# Patient Record
Sex: MALE | Race: OTHER | HISPANIC OR LATINO | Employment: OTHER | URBAN - METROPOLITAN AREA
[De-identification: names, ages, dates, MRNs, and addresses within clinical notes are randomized per-mention and may not be internally consistent; named-entity substitution may affect disease eponyms.]

---

## 2023-04-03 ENCOUNTER — EVALUATION (OUTPATIENT)
Dept: PHYSICAL THERAPY | Facility: CLINIC | Age: 78
End: 2023-04-03

## 2023-04-03 DIAGNOSIS — G89.29 CHRONIC PAIN OF RIGHT KNEE: ICD-10-CM

## 2023-04-03 DIAGNOSIS — Z96.651 TOTAL KNEE REPLACEMENT STATUS, RIGHT: Primary | ICD-10-CM

## 2023-04-03 DIAGNOSIS — M25.561 CHRONIC PAIN OF RIGHT KNEE: ICD-10-CM

## 2023-04-03 NOTE — PROGRESS NOTES
PT Evaluation     Today's date: 4/3/2023  Patient name: Nida Baez  : [birth year]  MRN: [id number]  Referring provider: Solange العلي DO  Dx:   Encounter Diagnosis     ICD-10-CM    1  Total knee replacement status, right  Z96 651       2  Chronic pain of right knee  M25 561     G89 29                      Assessment  Assessment details: Patient is a 76 y o  male who presents to physical therapy with physician diagnosis of Total knee replacement status, right  (primary encounter diagnosis)  Chronic pain of right knee  Patient would benefit from skilled physical therapy intervention to address his impairments and to maximize function  Thank you for your referral and please feel free to contact me at [phone]  Understanding of Dx/Px/POC: good   Prognosis: good    Goals  STG 4 weeks  Improve ROM by 50%  Improve strength by 50%    LTG 8 weeks  Ambulate without an AD  Independent with dressing and bathing    Plan  Patient would benefit from: skilled physical therapy  Referral necessary: No  Frequency: 3x week  Duration in weeks: 8          Subjective Evaluation    History of Present Illness  Mechanism of injury: Patient is status post right TKA on 3/6/2023  He has been receiving home PT  He is taking Tylenol PRN  He is performing his home exercises and icing PRN  He continues to ambulate with a RW  He requires assistance with dressing and bathing     4 steps into home  1st floor set up  Pain  Current pain ratin  At best pain ratin  At worst pain ratin  Quality: dull ache    Treatments  Previous treatment: physical therapy and medication  Patient Goals  Patient goals for therapy: decreased pain, decreased edema, improved balance, increased strength, independence with ADLs/IADLs and increased motion      Objective     General Comments:      Knee Comments  Severe pitting edema below knee  PROM 0-95  MMT 3+/5  Inefficient quad set  Gait- with RW and inefficient weight acceptance  Hypomobile TF and PF joints all planes  Soleus tightness             Precautions: none      Manuals 4/3                                                                Neuro Re-Ed                                                                                                        Ther Ex             Bike  AAROM 15 min                                                                                                       Ther Activity                                       Gait Training                                       Modalities

## 2023-04-03 NOTE — LETTER
April 3, 2023    Darryn Kennedy, [address]  [address] [place] [address]    Patient: Bozena Hayes   YOB: 1945   Date of Visit: 4/3/2023     Encounter Diagnosis     ICD-10-CM    1  Total knee replacement status, right  Z96 651       2  Chronic pain of right knee  M25 561     G89 29           Dear Dr Jair Jimenezside: Thank you for your recent referral of Bozena Hayes  Please review the attached evaluation summary from Neal's recent visit  Please verify that you agree with the plan of care by signing the attached order  If you have any questions or concerns, please do not hesitate to call  I sincerely appreciate the opportunity to share in the care of one of your patients and hope to have another opportunity to work with you in the near future  Sincerely,    Chris Schuler, PT      Referring Provider:      I certify that I have read the below Plan of Care and certify the need for these services furnished under this plan of treatment while under my care  Darryn Kennedy DO  [address] [address] [address]  [address]  [address] [address]  Via Fax: [phone]          PT Evaluation     Today's date: 4/3/2023  Patient name: Bozena Hayes  : [birth year]  MRN: [id number]  Referring provider: Farzana Daugherty DO  Dx:   Encounter Diagnosis     ICD-10-CM    1  Total knee replacement status, right  Z96 651       2  Chronic pain of right knee  M25 561     G89 29                      Assessment  Assessment details: Patient is a 76 y o  male who presents to physical therapy with physician diagnosis of Total knee replacement status, right  (primary encounter diagnosis)  Chronic pain of right knee  Patient would benefit from skilled physical therapy intervention to address his impairments and to maximize function  Thank you for your referral and please feel free to contact me at [phone]    Understanding of Dx/Px/POC: good   Prognosis: good    Goals  STG 4 weeks  Improve ROM by 50%  Improve strength by 50%    LTG 8 weeks  Ambulate without an AD  Independent with dressing and bathing    Plan  Patient would benefit from: skilled physical therapy  Referral necessary: No  Frequency: 3x week  Duration in weeks: 8          Subjective Evaluation    History of Present Illness  Mechanism of injury: Patient is status post right TKA on 3/6/2023  He has been receiving home PT  He is taking Tylenol PRN  He is performing his home exercises and icing PRN  He continues to ambulate with a RW  He requires assistance with dressing and bathing     4 steps into home  1st floor set up  Pain  Current pain ratin  At best pain ratin  At worst pain ratin  Quality: dull ache    Treatments  Previous treatment: physical therapy and medication  Patient Goals  Patient goals for therapy: decreased pain, decreased edema, improved balance, increased strength, independence with ADLs/IADLs and increased motion      Objective     General Comments:      Knee Comments  Severe pitting edema below knee  PROM 0-95  MMT 3+/5  Inefficient quad set  Gait- with RW and inefficient weight acceptance  Hypomobile TF and PF joints all planes  Soleus tightness            Precautions: none      Manuals 4/3                                                                Neuro Re-Ed                                                                                                        Ther Ex             Bike  AAROM 15 min                                                                                                       Ther Activity                                       Gait Training                                       Modalities

## 2023-04-06 ENCOUNTER — APPOINTMENT (OUTPATIENT)
Dept: PHYSICAL THERAPY | Facility: CLINIC | Age: 78
End: 2023-04-06

## 2023-04-10 ENCOUNTER — APPOINTMENT (OUTPATIENT)
Dept: PHYSICAL THERAPY | Facility: CLINIC | Age: 78
End: 2023-04-10

## 2023-04-24 ENCOUNTER — OFFICE VISIT (OUTPATIENT)
Dept: PHYSICAL THERAPY | Facility: CLINIC | Age: 78
End: 2023-04-24

## 2023-04-24 DIAGNOSIS — M25.561 CHRONIC PAIN OF RIGHT KNEE: Primary | ICD-10-CM

## 2023-04-24 DIAGNOSIS — G89.29 CHRONIC PAIN OF RIGHT KNEE: Primary | ICD-10-CM

## 2023-04-24 DIAGNOSIS — Z96.651 TOTAL KNEE REPLACEMENT STATUS, RIGHT: ICD-10-CM

## 2023-04-24 NOTE — PROGRESS NOTES
"Daily Note     Today's date: 2023  Patient name: Ubaldo Crump  : 1945  MRN: [id number]  Referring provider: Rozina Pendleton DO  Dx:   Encounter Diagnosis     ICD-10-CM    1  Chronic pain of right knee  M25 561     G89 29       2  Total knee replacement status, right  Z96 651                      Subjective: Pt notes no new complaints since lv  He feels the knee is slowly improving  Objective: See treatment diary below  49 8cm knee girth 5cm proximal to patella pre-Hwave and 47 8cm post     Assessment: Tolerated treatment well  Patient would benefit from continued PT  Focused on edema management today secondary to some reported increase  Educated on LE elevation for home to self manage  Plan: Continue per plan of care        Precautions: none      Manuals 4/3 4/12 4/14 4/17 4/19 4/21 4/24      MLD RLE  25'     8'      STM to patella tendon and quad tendon   ND ND                                   Neuro Re-Ed             Ankle pumps  2x30     x30      Ankle inv/ev  2x30           Supine heel slides  20x seated 20x ND ND JL CB      Seated knee flexion  30x           LAQ   20x ND ND JL Quad set x20      Seated knee extension and ankle DF stretch   5x30\" ND ND JL       Mini squats   20x ND ND JL       Ther Ex             Bike  AAROM 15 min  ND ND 20 min  JL CB                                                                                                 Ther Activity                                       Gait Training                                       Modalities             hwave low setting       20' foot and LB                        "

## 2023-04-26 ENCOUNTER — OFFICE VISIT (OUTPATIENT)
Dept: PHYSICAL THERAPY | Facility: CLINIC | Age: 78
End: 2023-04-26

## 2023-04-26 DIAGNOSIS — M25.561 CHRONIC PAIN OF RIGHT KNEE: Primary | ICD-10-CM

## 2023-04-26 DIAGNOSIS — G89.29 CHRONIC PAIN OF RIGHT KNEE: Primary | ICD-10-CM

## 2023-04-26 DIAGNOSIS — Z96.651 TOTAL KNEE REPLACEMENT STATUS, RIGHT: ICD-10-CM

## 2023-04-26 NOTE — PROGRESS NOTES
"Daily Note     Today's date: 2023  Patient name: Augusto Fleischer  : 1945  MRN: [id number]  Referring provider: Yo Jacobo DO  Dx:   Encounter Diagnosis     ICD-10-CM    1  Chronic pain of right knee  M25 561     G89 29       2  Total knee replacement status, right  Z96 651                      Subjective: Patient has an Ortho appointment on  and urologist appointment on   Objective: See treatment diary below      Assessment: Tolerated treatment well  Patient would benefit from continued PT  Focused on edema management today secondary to some reported increase  Plan: Continue per plan of care        Precautions: none      Manuals 4/3 4/12 4/14 4/17 4/19 4/21 4/24 4/26     MLD RLE  25'     8'      STM to patella tendon and quad tendon   ND ND                                   Neuro Re-Ed             Ankle pumps  2x30     x30      Ankle inv/ev  2x30           Supine heel slides  20x seated 20x ND ND JL CB      Seated knee flexion  30x           LAQ   20x ND ND JL Quad set x20      Seated knee extension and ankle DF stretch   5x30\" ND ND JL       Mini squats   20x ND ND JL       Ther Ex             Bike  AAROM 15 min  ND ND 20 min  JL CB ND     Foot on chair knee flexion stretch        20x                                                                                   Ther Activity             Chair sit to stands at table        20x                  Gait Training                                       Modalities             hwave low setting       20' foot and LB ND                       "

## 2023-04-28 ENCOUNTER — OFFICE VISIT (OUTPATIENT)
Dept: PHYSICAL THERAPY | Facility: CLINIC | Age: 78
End: 2023-04-28

## 2023-04-28 DIAGNOSIS — Z96.651 TOTAL KNEE REPLACEMENT STATUS, RIGHT: ICD-10-CM

## 2023-04-28 DIAGNOSIS — M25.561 CHRONIC PAIN OF RIGHT KNEE: Primary | ICD-10-CM

## 2023-04-28 DIAGNOSIS — G89.29 CHRONIC PAIN OF RIGHT KNEE: Primary | ICD-10-CM

## 2023-04-28 NOTE — PROGRESS NOTES
"Daily Note     Today's date: 2023  Patient name: Johanny Contreras  : 1945  MRN: [id number]  Referring provider: Kat Fong DO  Dx:   Encounter Diagnosis     ICD-10-CM    1  Chronic pain of right knee  M25 561     G89 29       2  Total knee replacement status, right  Z96 651                      Subjective: Progressing gradually  He has less redness and pitting today  ROM is also gradually improving  Objective: See treatment diary below      Assessment: Tolerated treatment well and ROM is progressing better with standing stretching techniques  He Still has pain at end range knee flexion and is short of 90 deg  Remains limited by bladder symptoms and catheter    Patient would benefit from continued PT      Plan: Continue per plan of care        Precautions: none      Manuals 4/3 4/12 4/14 4/17 4/19 4/21 4/24 4/26 4/28    MLD RLE  25'     8'      STM to patella tendon and quad tendon   ND ND                                   Neuro Re-Ed             Ankle pumps  2x30     x30      Ankle inv/ev  2x30           Supine heel slides  20x seated 20x ND ND JL CB      Seated knee flexion  30x           LAQ   20x ND ND JL Quad set x20      Seated knee extension and ankle DF stretch   5x30\" ND ND JL       Mini squats   20x ND ND JL       Ther Ex             Bike  AAROM 15 min  ND ND 20 min  JL CB ND JL    Foot on chair knee flexion stretch        20x JL                                                                                  Ther Activity             Chair sit to stands at table        20x JL                 Gait Training                                       Modalities             hwave low setting       20' foot and LB ND JL                           "

## 2023-05-01 ENCOUNTER — OFFICE VISIT (OUTPATIENT)
Dept: PHYSICAL THERAPY | Facility: CLINIC | Age: 78
End: 2023-05-01

## 2023-05-01 DIAGNOSIS — G89.29 CHRONIC PAIN OF RIGHT KNEE: Primary | ICD-10-CM

## 2023-05-01 DIAGNOSIS — M25.561 CHRONIC PAIN OF RIGHT KNEE: Primary | ICD-10-CM

## 2023-05-01 DIAGNOSIS — Z96.651 TOTAL KNEE REPLACEMENT STATUS, RIGHT: ICD-10-CM

## 2023-05-01 NOTE — PROGRESS NOTES
"Daily Note     Today's date: 2023  Patient name: Jasvir Ricardo  : 1945  MRN: [id number]  Referring provider: Estefany Zelaya DO  Dx:   Encounter Diagnosis     ICD-10-CM    1  Chronic pain of right knee  M25 561     G89 29       2  Total knee replacement status, right  Z96 651                      Subjective: Pt cont's to report slow improvement overall  Pt c/o pain in right shin and foot  Pt compliant with HEP  Objective: See treatment diary below      Assessment: Tolerated treatment well  Progressing slowly with right knee ROM; redness and pitting edema slowly decreasing  Patient would benefit from continued PT      Plan: Continue per plan of care  Pt has urologist appt  and family           Precautions: none      Manuals 4/3 4/12 4/14 4/17 4/19 4/21 4/24 4/26 4/28 5/1   MLD RLE  25'     8'      STM to patella tendon and quad tendon   ND ND                                   Neuro Re-Ed             Ankle pumps  2x30     x30   HEP   Ankle inv/ev  2x30           Supine heel slides  20x seated 20x ND ND JL CB   x10 with A   Seated knee flexion  30x           LAQ   20x ND ND JL Quad set x20   x15   Seated knee extension and ankle DF stretch   5x30\" ND ND JL    10\"x10 with A   Mini squats   20x ND ND JL       Ther Ex             Bike  AAROM 15 min  ND ND 20 min  JL CB ND JL GH   Foot on chair knee flexion stretch        20x JL GH                                                                                 Ther Activity             Chair sit to stands at table        20x JL [address]                Gait Training                                       Modalities             hwave low setting       20' foot and LB ND JL GH                        "

## 2023-05-08 ENCOUNTER — OFFICE VISIT (OUTPATIENT)
Dept: PHYSICAL THERAPY | Facility: CLINIC | Age: 78
End: 2023-05-08

## 2023-05-08 DIAGNOSIS — G89.29 CHRONIC PAIN OF RIGHT KNEE: Primary | ICD-10-CM

## 2023-05-08 DIAGNOSIS — M25.561 CHRONIC PAIN OF RIGHT KNEE: Primary | ICD-10-CM

## 2023-05-08 DIAGNOSIS — Z96.651 TOTAL KNEE REPLACEMENT STATUS, RIGHT: ICD-10-CM

## 2023-05-08 NOTE — PROGRESS NOTES
"Daily Note     Today's date: 2023  Patient name: Jazzmine Dupree  : 1945  MRN: [id number]  Referring provider: Soraya Alvares DO  Dx:   Encounter Diagnosis     ICD-10-CM    1  Chronic pain of right knee  M25 561     G89 29       2  Total knee replacement status, right  Z96 651                      Subjective: Patient reports finishing antibiotics last Tuesday  Objective: See treatment diary below      Assessment: Tolerated treatment well  Patient would benefit from continued PT   Contacted MD regarding s/s of cellulitis, swelling, redness and pain over there anterior shin  Plan: Continue per plan of care         Precautions: none      Manuals             MLD RLE             STM to patella tendon and quad tendon                                       Neuro Re-Ed                                       Supine heel slides home                         LAQ 20x            Seated knee extension and ankle DF stretch             Mini squats             Ther Ex             Bike  10 min            Foot on chair knee flexion stretch 10x10\"                                                                                          Ther Activity             Chair sit to stands at table 20x                         Gait Training                                       Modalities             hwave low setting                                  "

## 2023-05-11 ENCOUNTER — OFFICE VISIT (OUTPATIENT)
Dept: PHYSICAL THERAPY | Facility: CLINIC | Age: 78
End: 2023-05-11

## 2023-05-11 DIAGNOSIS — G89.29 CHRONIC PAIN OF RIGHT KNEE: Primary | ICD-10-CM

## 2023-05-11 DIAGNOSIS — M25.561 CHRONIC PAIN OF RIGHT KNEE: Primary | ICD-10-CM

## 2023-05-11 DIAGNOSIS — Z96.651 TOTAL KNEE REPLACEMENT STATUS, RIGHT: ICD-10-CM

## 2023-05-11 NOTE — PROGRESS NOTES
"Daily Note     Today's date: 2023  Patient name: Jules Connell  : 1945  MRN: [id number]  Referring provider: Donte Mariscal DO  Dx:   Encounter Diagnosis     ICD-10-CM    1  Chronic pain of right knee  M25 561     G89 29       2  Total knee replacement status, right  Z96 651                      Subjective: Redness is improved compared to earlier in the week  His knee is sore today, but he has been walking longer distances outside this week  Objective: See treatment diary below      Assessment: Tolerated treatment well and improved conduction observed with Hwave    Patient exhibited good technique with therapeutic exercises and would benefit from continued PT      Plan: Continue per plan of care  Progress treatment as tolerated         Precautions: none      Manuals             MLD RLE             STM to patella tendon and quad tendon                                       Neuro Re-Ed                                       Supine heel slides home                         LAQ 20x            Seated knee extension and ankle DF stretch             Mini squats             Ther Ex             Bike  10 min            Foot on chair knee flexion stretch 10x10\"                                                                                          Ther Activity             Chair sit to stands at table 20x                         Gait Training                                       Modalities             mnaoharave low setting 15'                                   "

## 2023-05-15 ENCOUNTER — OFFICE VISIT (OUTPATIENT)
Dept: PHYSICAL THERAPY | Facility: CLINIC | Age: 78
End: 2023-05-15

## 2023-05-15 DIAGNOSIS — Z96.651 TOTAL KNEE REPLACEMENT STATUS, RIGHT: ICD-10-CM

## 2023-05-15 DIAGNOSIS — M25.561 CHRONIC PAIN OF RIGHT KNEE: Primary | ICD-10-CM

## 2023-05-15 DIAGNOSIS — G89.29 CHRONIC PAIN OF RIGHT KNEE: Primary | ICD-10-CM

## 2023-05-15 NOTE — PROGRESS NOTES
"Daily Note     Today's date: 5/15/2023  Patient name: Soo Goldberg  : 1945  MRN: [id number]  Referring provider: María Khan DO  Dx:   Encounter Diagnosis     ICD-10-CM    1  Chronic pain of right knee  M25 561     G89 29       2  Total knee replacement status, right  Z96 651                      Subjective: Soreness over the anterior knee today  Objective: See treatment diary below      Assessment: Tolerated treatment fair and swelling is significantly improved since last week  PCP follow up scheduled for later today to look at continued point tenderness and redness in post op lower leg    Patient exhibited good technique with therapeutic exercises and would benefit from continued PT      Plan: Continue per plan of care  Progress treatment as tolerated         Precautions: none      Manuals 5/15            MLD RLE             STM to patella tendon and quad tendon                                       Neuro Re-Ed                                       Supine heel slides home                         LAQ 20x            Seated knee extension and ankle DF stretch             Mini squats             Ther Ex             Bike  10 min            Foot on chair knee flexion stretch 10x10\"                                                                                          Ther Activity             Chair sit to stands at table 20x                         Gait Training                                       Modalities             hwave low setting 15'                                   "

## 2023-05-18 ENCOUNTER — OFFICE VISIT (OUTPATIENT)
Dept: PHYSICAL THERAPY | Facility: CLINIC | Age: 78
End: 2023-05-18

## 2023-05-18 DIAGNOSIS — Z96.651 TOTAL KNEE REPLACEMENT STATUS, RIGHT: ICD-10-CM

## 2023-05-18 DIAGNOSIS — G89.29 CHRONIC PAIN OF RIGHT KNEE: Primary | ICD-10-CM

## 2023-05-18 DIAGNOSIS — M25.561 CHRONIC PAIN OF RIGHT KNEE: Primary | ICD-10-CM

## 2023-05-18 NOTE — PROGRESS NOTES
"Daily Note     Today's date: 2023  Patient name: Justo Singletary  : 1945  MRN: [id number]  Referring provider: Kristyn Herrera DO  Dx:   Encounter Diagnosis     ICD-10-CM    1  Chronic pain of right knee  M25 561     G89 29       2  Total knee replacement status, right  Z96 651                      Subjective: Alcides Frank is having less knee pain and swelling today  New IBX are working well       Objective: See treatment diary below      Assessment: Tolerated treatment fair and increased MT today to promote both flexion and extension  He is still noted with tenderness over the quad and distal knee  Able to tolerate gentle MT to the quad and HS to promote improved ROM  Patient exhibited good technique with therapeutic exercises and would benefit from continued PT      Plan: Continue per plan of care        Precautions: none      Manuals             MLD RLE             STM to patella tendon and quad tendon                                       Neuro Re-Ed                                       Supine heel slides home                         LAQ 20x            Seated knee extension and ankle DF stretch             Mini squats             Ther Ex             Bike  10 min            Foot on chair knee flexion stretch 10x10\"                                                                                          Ther Activity             Chair sit to stands at table 20x                         Gait Training                                       Modalities             hwave low setting 15'                                   " How Severe Is It?: mild Is This A New Presentation, Or A Follow-Up?: Follow Up Isotretinoin

## 2023-05-22 ENCOUNTER — OFFICE VISIT (OUTPATIENT)
Dept: PHYSICAL THERAPY | Facility: CLINIC | Age: 78
End: 2023-05-22

## 2023-05-22 DIAGNOSIS — M25.561 CHRONIC PAIN OF RIGHT KNEE: Primary | ICD-10-CM

## 2023-05-22 DIAGNOSIS — G89.29 CHRONIC PAIN OF RIGHT KNEE: Primary | ICD-10-CM

## 2023-05-22 DIAGNOSIS — Z96.651 TOTAL KNEE REPLACEMENT STATUS, RIGHT: ICD-10-CM

## 2023-05-22 NOTE — PROGRESS NOTES
"Daily Note     Today's date: 2023  Patient name: Phyllis Arriola  : 1945  MRN: [id number]  Referring provider: Dawna Soto DO  Dx:   Encounter Diagnosis     ICD-10-CM    1  Chronic pain of right knee  M25 561     G89 29       2  Total knee replacement status, right  Z96 651                      Subjective: Less soreness and swelling in the right knee today  Scheduled for manipulation on   Objective: See treatment diary below      Assessment: Tolerated treatment well and continued to work on improving mobility around the right knee joint    Patient demonstrated fatigue post treatment and would benefit from continued PT      Plan: Continue per plan of care  Progress treatment as tolerated         Precautions: none      Manuals            MLD RLE             STM to patella tendon and quad tendon  15'                                     Neuro Re-Ed                                       Supine heel slides home                         LAQ 20x 5: 20x           Seated knee extension and ankle DF stretch             Mini squats             Ther Ex             Bike  10 min 15'           Foot on chair knee flexion stretch 10x10\"                                                                                          Ther Activity             Chair sit to stands at table 20x                         Gait Training                                       Modalities             hwave low setting 15' 15'                                  "

## 2023-05-24 ENCOUNTER — OFFICE VISIT (OUTPATIENT)
Dept: PHYSICAL THERAPY | Facility: CLINIC | Age: 78
End: 2023-05-24

## 2023-05-24 DIAGNOSIS — Z96.651 TOTAL KNEE REPLACEMENT STATUS, RIGHT: ICD-10-CM

## 2023-05-24 DIAGNOSIS — G89.29 CHRONIC PAIN OF RIGHT KNEE: Primary | ICD-10-CM

## 2023-05-24 DIAGNOSIS — M25.561 CHRONIC PAIN OF RIGHT KNEE: Primary | ICD-10-CM

## 2023-05-24 NOTE — PROGRESS NOTES
"Daily Note     Today's date: 2023  Patient name: Iram Raymond  : 1945  MRN: [id number]  Referring provider: Aleida Candelario DO  Dx:   Encounter Diagnosis     ICD-10-CM    1  Chronic pain of right knee  M25 561     G89 29       2  Total knee replacement status, right  Z96 651                      Subjective: Swelling is now worse in the left lower leg vs  The right  Objective: See treatment diary below  Supine Flexion:  52 deg  Seated flexion:  80 deg  After bike:   90deg    Assessment: Tolerated treatment well and is noted with improved knee flexion by end of session  Scheduled for manipulation of the right knee tomorrow    Patient would benefit from continued PT      Plan: Continue per plan of care        Precautions: none      Manuals            MLD RLE             STM to patella tendon and quad tendon  15'                                     Neuro Re-Ed                                       Supine heel slides home                         LAQ 20x 5: 20x           Seated knee extension and ankle DF stretch             Mini squats             Ther Ex             Bike  10 min 15'           Foot on chair knee flexion stretch 10x10\"                                                                                          Ther Activity             Chair sit to stands at table 20x                         Gait Training                                       Modalities             hwave low setting 15' 15'                                  "

## 2023-05-26 ENCOUNTER — EVALUATION (OUTPATIENT)
Dept: PHYSICAL THERAPY | Facility: CLINIC | Age: 78
End: 2023-05-26

## 2023-05-26 DIAGNOSIS — Z96.651 TOTAL KNEE REPLACEMENT STATUS, RIGHT: Primary | ICD-10-CM

## 2023-05-26 NOTE — LETTER
May 26, 2023      No Recipients    Patient: Sal Beckett   YOB: 1945   Date of Visit: 2023     Encounter Diagnosis     ICD-10-CM    1  Total knee replacement status, right  Z96 651           Dear Dr Schwarz Ripon Recipients: Thank you for your recent referral of Sal Beckett  Please review the attached evaluation summary from Neal's recent visit  Please verify that you agree with the plan of care by signing the attached order  If you have any questions or concerns, please do not hesitate to call  I sincerely appreciate the opportunity to share in the care of one of your patients and hope to have another opportunity to work with you in the near future  Sincerely,    Estee Edge, PT      Referring Provider:      I certify that I have read the below Plan of Care and certify the need for these services furnished under this plan of treatment while under my care  No Recipients          PT Re-Evaluation     Today's date: 2023  Patient name: Sal Beckett  : 1945  MRN: [id number]  Referring provider: Nancy Bacon DO  Dx:   Encounter Diagnosis     ICD-10-CM    1  Total knee replacement status, right  Z96 651                      Assessment  Assessment details: Jeffrey Luevano has been compliant with attending PT and home exercise program since initial eval and reports 50% improvement since start of care  Jeffrey Luevano reports and demonstrates decreased pain and increased ROM since initial eval but is still limited compared to prior level of function  He underwent manipulation under anesthesia yesterday, with knee flexion measured at 125 deg  Jeffrey Luevano continues with above listed impairments and would benefit from additional skilled PT to address these deficits to return to prior level of function      Understanding of Dx/Px/POC: good   Prognosis: good    Goals  STG 4 weeks  Improve ROM by 50%  Improve strength by 50%    LTG 8 weeks  Ambulate "without an AD  Independent with dressing and bathing    Plan  Plan details: 5x/wk for 2 weeks, then decreasing frequency based on progress  Patient would benefit from: skilled physical therapy  Referral necessary: No  Frequency: 5x week  Duration in weeks: 6          Subjective Evaluation    History of Present Illness  Mechanism of injury: Patient is status post right TKA on 3/6/2023  He has been receiving home PT  He is taking Tylenol PRN  He is performing his home exercises and icing PRN  He continues to ambulate with a RW  He requires assistance with dressing and bathing     4 steps into home  1st floor set up  Pain  Current pain ratin  At best pain ratin  At worst pain ratin  Quality: dull ache    Treatments  Previous treatment: physical therapy and medication  Patient Goals  Patient goals for therapy: decreased pain, decreased edema, improved balance, increased strength, independence with ADLs/IADLs and increased motion      Objective     General Comments:      Knee Comments    PROM 0-120  MMT 3+/5  Gait- with RW and inefficient weight acceptance  Hypomobile TF and PF joints all planes  Soleus tightness            Precautions: none      Manuals           MLD RLE             STM to patella tendon and quad tendon  15' 15' + PROM flex                                    Neuro Re-Ed                                       Supine heel slides home  20x                       LAQ 20x 5: 20x           Seated knee extension and ankle DF stretch             Mini squats   STS 20x          Ther Ex             Bike  10 min 15' 15'          Foot on chair knee flexion stretch 10x10\"                                                                                          Ther Activity             Chair sit to stands at table 20x                         Gait Training                                       Modalities             hwave low setting 15' 15'                                              "

## 2023-05-26 NOTE — PROGRESS NOTES
PT Re-Evaluation     Today's date: 2023  Patient name: Hesham Munoz  : [birth year]  MRN: [id number]  Referring provider: Alejandro Zimmerman DO  Dx:   Encounter Diagnosis     ICD-10-CM    1  Total knee replacement status, right  Z96 651                      Assessment  Assessment details: Casimer Plater has been compliant with attending PT and home exercise program since initial eval and reports 50% improvement since start of care  Casimer Plater reports and demonstrates decreased pain and increased ROM since initial eval but is still limited compared to prior level of function  He underwent manipulation under anesthesia yesterday, with knee flexion measured at 125 deg  Casimer Plater continues with above listed impairments and would benefit from additional skilled PT to address these deficits to return to prior level of function  Understanding of Dx/Px/POC: good   Prognosis: good    Goals  STG 4 weeks  Improve ROM by 50%  Improve strength by 50%    LTG 8 weeks  Ambulate without an AD  Independent with dressing and bathing    Plan  Plan details: 5x/wk for 2 weeks, then decreasing frequency based on progress  Patient would benefit from: skilled physical therapy  Referral necessary: No  Frequency: 5x week  Duration in weeks: 6          Subjective Evaluation    History of Present Illness  Mechanism of injury: Patient is status post right TKA on 3/6/2023  He has been receiving home PT  He is taking Tylenol PRN  He is performing his home exercises and icing PRN  He continues to ambulate with a RW  He requires assistance with dressing and bathing     4 steps into home  1st floor set up  Pain  Current pain ratin  At best pain ratin  At worst pain ratin  Quality: dull ache    Treatments  Previous treatment: physical therapy and medication  Patient Goals  Patient goals for therapy: decreased pain, decreased edema, improved balance, increased strength, independence with ADLs/IADLs and increased motion      Objective "    General Comments:      Knee Comments    PROM 0-120  MMT 3+/5  Gait- with RW and inefficient weight acceptance  Hypomobile TF and PF joints all planes  Soleus tightness             Precautions: none      Manuals 5/18 5/24 5/26          MLD RLE             STM to patella tendon and quad tendon  15' 15' + PROM flex                                    Neuro Re-Ed                                       Supine heel slides home  20x                       LAQ 20x 5: 20x           Seated knee extension and ankle DF stretch             Mini squats   STS 20x          Ther Ex             Bike  10 min 15' 15'          Foot on chair knee flexion stretch 10x10\"                                                                                          Ther Activity             Chair sit to stands at table 20x                         Gait Training                                       Modalities             hwave low setting 15' 15'                             "

## 2023-05-30 ENCOUNTER — OFFICE VISIT (OUTPATIENT)
Dept: PHYSICAL THERAPY | Facility: CLINIC | Age: 78
End: 2023-05-30

## 2023-05-30 DIAGNOSIS — M25.561 CHRONIC PAIN OF RIGHT KNEE: ICD-10-CM

## 2023-05-30 DIAGNOSIS — Z96.651 TOTAL KNEE REPLACEMENT STATUS, RIGHT: Primary | ICD-10-CM

## 2023-05-30 DIAGNOSIS — G89.29 CHRONIC PAIN OF RIGHT KNEE: ICD-10-CM

## 2023-05-30 NOTE — PROGRESS NOTES
"Daily Note     Today's date: 2023  Patient name: Jc Arrieta  : 1945  MRN: [id number]  Referring provider: Abiodun Alvarez DO  Dx:   Encounter Diagnosis     ICD-10-CM    1  Total knee replacement status, right  Z96 651       2  Chronic pain of right knee  M25 561     G89 29                      Subjective: Progressing well with both pain and ROM,  Has been using RW less often at home  Objective: See treatment diary below      Assessment: Tolerated treatment well  Patient demonstrated fatigue post treatment and would benefit from continued PT      Plan: Continue per plan of care  Progress treatment as tolerated         Precautions: none      Manuals          MLD RLE             STM to patella tendon and quad tendon  15' 15' + PROM flex 15'         Prone knee flexion    5'                      Neuro Re-Ed                                       Supine heel slides home  20x 20x                      LAQ 20x 5: 20x           Seated knee extension and ankle DF stretch             Mini squats   STS 20x With 4\" black 20x         Ther Ex             Bike  10 min 15' 15' 15'         Foot on chair knee flexion stretch 10x10\"                                                                                          Ther Activity             Chair sit to stands at table 20x                         Gait Training                                       Modalities             hwave low setting 15' 15'                                  "

## 2023-05-31 ENCOUNTER — OFFICE VISIT (OUTPATIENT)
Dept: PHYSICAL THERAPY | Facility: CLINIC | Age: 78
End: 2023-05-31

## 2023-05-31 DIAGNOSIS — G89.29 CHRONIC PAIN OF RIGHT KNEE: ICD-10-CM

## 2023-05-31 DIAGNOSIS — M25.561 CHRONIC PAIN OF RIGHT KNEE: ICD-10-CM

## 2023-05-31 DIAGNOSIS — Z96.651 TOTAL KNEE REPLACEMENT STATUS, RIGHT: Primary | ICD-10-CM

## 2023-05-31 NOTE — PROGRESS NOTES
"Daily Note     Today's date: 2023  Patient name: Deirdre Hudson  : 1945  MRN: [id number]  Referring provider: Neel Byers DO  Dx:   Encounter Diagnosis     ICD-10-CM    1  Total knee replacement status, right  Z96 651       2  Chronic pain of right knee  M25 561     G89 29                      Subjective: Jeane Srivastava is doing well, sore from working on walking and ROM  Using [facility] at home without issues  Objective: See treatment diary below      Assessment: Tolerated treatment well and PROM to 122 deg by end of session today    Patient demonstrated fatigue post treatment and would benefit from continued PT      Plan: Continue per plan of care  Progress treatment as tolerated         Precautions: none      Manuals         MLD RLE             STM to patella tendon and quad tendon  15' 15' + PROM flex 15' JL        Prone knee flexion    5' JL                     Neuro Re-Ed                                       Supine heel slides home  20x 20x JL                     LAQ 20x 5: 20x           Seated knee extension and ankle DF stretch             Mini squats   STS 20x With 4\" black 20x JL        Ther Ex             Bike  10 min 15' 15' 15' 10'        Foot on chair knee flexion stretch 10x10\"                                                                                          Ther Activity             Chair sit to stands at table 20x                         Gait Training                                       Modalities             hwave low setting 15' 15'                                  "

## 2023-06-01 ENCOUNTER — OFFICE VISIT (OUTPATIENT)
Dept: PHYSICAL THERAPY | Facility: CLINIC | Age: 78
End: 2023-06-01

## 2023-06-01 DIAGNOSIS — M25.561 CHRONIC PAIN OF RIGHT KNEE: ICD-10-CM

## 2023-06-01 DIAGNOSIS — Z96.651 TOTAL KNEE REPLACEMENT STATUS, RIGHT: Primary | ICD-10-CM

## 2023-06-01 DIAGNOSIS — G89.29 CHRONIC PAIN OF RIGHT KNEE: ICD-10-CM

## 2023-06-01 NOTE — PROGRESS NOTES
"Daily Note     Today's date: 2023  Patient name: Migue Gómez  : 1945  MRN: [id number]  Referring provider: Leon Mckenna DO  Dx:   Encounter Diagnosis     ICD-10-CM    1  Total knee replacement status, right  Z96 651       2  Chronic pain of right knee  M25 561     G89 29                      Subjective: Sore as his ROM progresses      Objective: See treatment diary below      Assessment: Tolerated treatment well and noted with spasticity and clonus with stretching in prone, curious if this is related to bladder retention    Patient exhibited good technique with therapeutic exercises and would benefit from continued PT      Plan: Continue per plan of care  Progress treatment as tolerated         Precautions: none      Manuals         MLD RLE             STM to patella tendon and quad tendon  15' 15' + PROM flex 15' JL        Prone knee flexion    5' JL                     Neuro Re-Ed                                       Supine heel slides home  20x 20x JL                     LAQ 20x 5: 20x           Seated knee extension and ankle DF stretch             Mini squats   STS 20x With 4\" black 20x JL        Ther Ex             Bike  10 min 15' 15' 15' 10'        Foot on chair knee flexion stretch 10x10\"                                                                                          Ther Activity             Chair sit to stands at table 20x                         Gait Training                                       Modalities             hwave low setting 15' 15'                                  "

## 2023-06-02 ENCOUNTER — OFFICE VISIT (OUTPATIENT)
Dept: PHYSICAL THERAPY | Facility: CLINIC | Age: 78
End: 2023-06-02

## 2023-06-02 DIAGNOSIS — G89.29 CHRONIC PAIN OF RIGHT KNEE: ICD-10-CM

## 2023-06-02 DIAGNOSIS — Z96.651 TOTAL KNEE REPLACEMENT STATUS, RIGHT: Primary | ICD-10-CM

## 2023-06-02 DIAGNOSIS — M25.561 CHRONIC PAIN OF RIGHT KNEE: ICD-10-CM

## 2023-06-02 NOTE — PROGRESS NOTES
"Daily Note     Today's date: 2023  Patient name: Jimmie Chappell  : 1945  MRN: [id number]  Referring provider: Ariana Monreal DO  Dx:   Encounter Diagnosis     ICD-10-CM    1  Total knee replacement status, right  Z96 651       2  Chronic pain of right knee  M25 561     G89 29                      Subjective: Progressing well towards all goals  Objective: See treatment diary below      Assessment: Tolerated treatment well  Patient demonstrated fatigue post treatment, exhibited good technique with therapeutic exercises and would benefit from continued PT      Plan: Continue per plan of care        Precautions: none      Manuals         MLD RLE             STM to patella tendon and quad tendon  15' 15' + PROM flex 15' JL        Prone knee flexion    5' JL                     Neuro Re-Ed                                       Supine heel slides home  20x 20x JL                     LAQ 20x 5: 20x           Seated knee extension and ankle DF stretch             Mini squats   STS 20x With 4\" black 20x JL        Ther Ex             Bike  10 min 15' 15' 15' 10'        Foot on chair knee flexion stretch 10x10\"                                                                                          Ther Activity             Chair sit to stands at table 20x                         Gait Training                                       Modalities             hwave low setting 15' 15'                                  "

## 2023-06-05 ENCOUNTER — OFFICE VISIT (OUTPATIENT)
Dept: PHYSICAL THERAPY | Facility: CLINIC | Age: 78
End: 2023-06-05
Payer: MEDICARE

## 2023-06-05 DIAGNOSIS — G89.29 CHRONIC PAIN OF RIGHT KNEE: ICD-10-CM

## 2023-06-05 DIAGNOSIS — Z96.651 TOTAL KNEE REPLACEMENT STATUS, RIGHT: Primary | ICD-10-CM

## 2023-06-05 DIAGNOSIS — M25.561 CHRONIC PAIN OF RIGHT KNEE: ICD-10-CM

## 2023-06-05 PROCEDURE — 97112 NEUROMUSCULAR REEDUCATION: CPT | Performed by: PHYSICAL THERAPIST

## 2023-06-05 PROCEDURE — 97110 THERAPEUTIC EXERCISES: CPT | Performed by: PHYSICAL THERAPIST

## 2023-06-05 PROCEDURE — 97140 MANUAL THERAPY 1/> REGIONS: CPT | Performed by: PHYSICAL THERAPIST

## 2023-06-05 NOTE — PROGRESS NOTES
"Daily Note     Today's date: 2023  Patient name: Carletha Curling  : 1945  MRN: [id number]  Referring provider: Sanket Courtney DO  Dx:   Encounter Diagnosis     ICD-10-CM    1  Total knee replacement status, right  Z96 651       2  Chronic pain of right knee  M25 561     G89 29                      Subjective: Working hard on flexion ROM at home  Objective: See treatment diary below      Assessment: Tolerated treatment well and PROM to 117 deg today during MT, ROM continued to improve throughout session  Patient demonstrated fatigue post treatment and would benefit from continued PT      Plan: Continue per plan of care        Precautions: none      Manuals        MLD RLE             STM to patella tendon and quad tendon  15' 15' + PROM flex 15' JL JL       Prone knee flexion    5' JL JL                    Neuro Re-Ed                                       Supine heel slides home  20x 20x JL JL                    LAQ 20x 5: 20x           Seated knee extension and ankle DF stretch             Mini squats   STS 20x With 4\" black 20x JL        Ther Ex             Bike  10 min 15' 15' 15' 10' JL       Foot on chair knee flexion stretch 10x10\"            VG DL squats      50% 20x                                                                        Ther Activity             Chair sit to stands at table 20x                         Gait Training                                       Modalities             hwave low setting 15' 15'                                  "

## 2023-06-06 ENCOUNTER — OFFICE VISIT (OUTPATIENT)
Dept: PHYSICAL THERAPY | Facility: CLINIC | Age: 78
End: 2023-06-06
Payer: MEDICARE

## 2023-06-06 DIAGNOSIS — M25.561 CHRONIC PAIN OF RIGHT KNEE: ICD-10-CM

## 2023-06-06 DIAGNOSIS — Z96.651 TOTAL KNEE REPLACEMENT STATUS, RIGHT: Primary | ICD-10-CM

## 2023-06-06 DIAGNOSIS — G89.29 CHRONIC PAIN OF RIGHT KNEE: ICD-10-CM

## 2023-06-06 PROCEDURE — 97110 THERAPEUTIC EXERCISES: CPT | Performed by: PHYSICAL THERAPIST

## 2023-06-06 PROCEDURE — 97140 MANUAL THERAPY 1/> REGIONS: CPT | Performed by: PHYSICAL THERAPIST

## 2023-06-06 PROCEDURE — 97112 NEUROMUSCULAR REEDUCATION: CPT | Performed by: PHYSICAL THERAPIST

## 2023-06-06 NOTE — PROGRESS NOTES
"Daily Note     Today's date: 2023  Patient name: Мария Pillai  : 1945  MRN: [id number]  Referring provider: Delmy Bravo DO  Dx:   Encounter Diagnosis     ICD-10-CM    1  Total knee replacement status, right  Z96 651       2  Chronic pain of right knee  M25 561     G89 29                      Subjective: Pt reports some continued stiffness in the knee but been improving  He returns to ortho for f/u nv,      Objective: See treatment diary below  114* flexion today    Assessment: Tolerated treatment well  Patient would benefit from continued PT  Continued patellar mobility deficits into knee flexion  Overall, improved since manipulation  Plan: Continue per plan of care        Precautions: none      Manuals       MLD RLE             STM to patella tendon and quad tendon  15' 15' + PROM flex 15' JL JL CB      Prone knee flexion    5' JL JL CB                   Neuro Re-Ed                                       Supine heel slides home  20x 20x JL JL CB                   LAQ 20x 5: 20x           Seated knee extension and ankle DF stretch             Mini squats   STS 20x With 4\" black 20x JL        Ther Ex             Bike  10 min 15' 15' 15' 10' JL CB      Foot on chair knee flexion stretch 10x10\"            VG DL squats      50% 20x CB                                                                       Ther Activity             Chair sit to stands at table 20x                         Gait Training                                       Modalities             hwave low setting 15' 15'                             "

## 2023-06-12 ENCOUNTER — OFFICE VISIT (OUTPATIENT)
Dept: PHYSICAL THERAPY | Facility: CLINIC | Age: 78
End: 2023-06-12
Payer: MEDICARE

## 2023-06-12 DIAGNOSIS — Z96.651 TOTAL KNEE REPLACEMENT STATUS, RIGHT: Primary | ICD-10-CM

## 2023-06-12 DIAGNOSIS — M25.561 CHRONIC PAIN OF RIGHT KNEE: ICD-10-CM

## 2023-06-12 DIAGNOSIS — G89.29 CHRONIC PAIN OF RIGHT KNEE: ICD-10-CM

## 2023-06-12 PROCEDURE — 97140 MANUAL THERAPY 1/> REGIONS: CPT | Performed by: PHYSICAL THERAPIST

## 2023-06-12 PROCEDURE — 97110 THERAPEUTIC EXERCISES: CPT | Performed by: PHYSICAL THERAPIST

## 2023-06-12 PROCEDURE — 97112 NEUROMUSCULAR REEDUCATION: CPT | Performed by: PHYSICAL THERAPIST

## 2023-06-12 NOTE — PROGRESS NOTES
"Daily Note     Today's date: 2023  Patient name: Alina Joya  : 1945  MRN: [id number]  Referring provider: Joni Ya DO  Dx:   Encounter Diagnosis     ICD-10-CM    1  Total knee replacement status, right  Z96 651       2  Chronic pain of right knee  M25 561     G89 29                      Subjective: ROM continues to improve  Left leg is more swollen than the right       Objective: See treatment diary below      Assessment: Tolerated treatment well  Patient demonstrated fatigue post treatment and would benefit from continued PT      Plan: Continue per plan of care  Progress treatment as tolerated         Precautions: none      Manuals      MLD RLE             STM to patella tendon and quad tendon  15' 15' + PROM flex 15' JL JL CB JL     Prone knee flexion    5' JL JL CB JL                  Neuro Re-Ed                                       Supine heel slides home  20x 20x JL JL CB JL                  LAQ 20x 5: 20x           Seated knee extension and ankle DF stretch             Mini squats   STS 20x With 4\" black 20x JL        Ther Ex             Bike  10 min 15' 15' 15' 10' JL CB JL     Foot on chair knee flexion stretch 10x10\"            VG DL squats      50% 20x CB 30x     Leg press        SL 45# 2x10                                                         Ther Activity             Chair sit to stands at table 20x                         Gait Training                                       Modalities             hwave low setting 15' 15'                                  "

## 2023-06-14 ENCOUNTER — OFFICE VISIT (OUTPATIENT)
Dept: PHYSICAL THERAPY | Facility: CLINIC | Age: 78
End: 2023-06-14
Payer: MEDICARE

## 2023-06-14 DIAGNOSIS — Z96.651 TOTAL KNEE REPLACEMENT STATUS, RIGHT: Primary | ICD-10-CM

## 2023-06-14 DIAGNOSIS — M25.561 CHRONIC PAIN OF RIGHT KNEE: ICD-10-CM

## 2023-06-14 DIAGNOSIS — G89.29 CHRONIC PAIN OF RIGHT KNEE: ICD-10-CM

## 2023-06-14 PROCEDURE — 97112 NEUROMUSCULAR REEDUCATION: CPT

## 2023-06-14 PROCEDURE — 97110 THERAPEUTIC EXERCISES: CPT

## 2023-06-14 PROCEDURE — 97140 MANUAL THERAPY 1/> REGIONS: CPT

## 2023-06-14 NOTE — PROGRESS NOTES
"Daily Note     Today's date: 2023  Patient name: Purnima Lam  : 1945  MRN: [id number]  Referring provider: Sylwia Salcedo DO  Dx:   Encounter Diagnosis     ICD-10-CM    1  Total knee replacement status, right  Z96 651       2  Chronic pain of right knee  M25 561     G89 29                      Subjective: Pt reports he is doing ok, feels stiff today due to the colder weather  Objective: See treatment diary below      Assessment: Tolerated treatment well  Patient demonstrated fatigue post treatment, exhibited good technique with therapeutic exercises and would benefit from continued PT      Plan: Continue per plan of care        Precautions: none      Manuals     MLD RLE             STM to patella tendon and quad tendon  15' 15' + PROM flex 15' JL JL CB JL KM    Prone knee flexion    5' JL JL CB JL KM                 Neuro Re-Ed                                       Supine heel slides home  20x 20x JL JL CB JL 20x                 LAQ 20x 5: 20x           Seated knee extension and ankle DF stretch             Mini squats   STS 20x With 4\" black 20x JL        Ther Ex             Bike  10 min 15' 15' 15' 10' JL CB JL 12'    Foot on chair knee flexion stretch 10x10\"            VG DL squats      50% 20x CB 30x 30x    Leg press        SL 45# 2x10 SL 45# 2x10                                                        Ther Activity             Chair sit to stands at table 20x                         Gait Training                                       Modalities             hwave low setting 15' 15'                             "

## 2023-06-21 ENCOUNTER — OFFICE VISIT (OUTPATIENT)
Dept: PHYSICAL THERAPY | Facility: CLINIC | Age: 78
End: 2023-06-21
Payer: MEDICARE

## 2023-06-21 DIAGNOSIS — M25.561 CHRONIC PAIN OF RIGHT KNEE: ICD-10-CM

## 2023-06-21 DIAGNOSIS — G89.29 CHRONIC PAIN OF RIGHT KNEE: ICD-10-CM

## 2023-06-21 DIAGNOSIS — Z96.651 TOTAL KNEE REPLACEMENT STATUS, RIGHT: Primary | ICD-10-CM

## 2023-06-21 PROCEDURE — 97116 GAIT TRAINING THERAPY: CPT | Performed by: PHYSICAL THERAPIST

## 2023-06-21 PROCEDURE — 97112 NEUROMUSCULAR REEDUCATION: CPT | Performed by: PHYSICAL THERAPIST

## 2023-06-21 NOTE — PROGRESS NOTES
"Daily Note     Today's date: 2023  Patient name: Maionr Miller  : 1945  MRN: [id number]  Referring provider: Mari Aguilar DO  Dx:   Encounter Diagnosis     ICD-10-CM    1  Total knee replacement status, right  Z96 651       2  Chronic pain of right knee  M25 561     G89 29                      Subjective: Feeling much better recently  Using [facility]PC less often at home  Objective: See treatment diary below      Assessment: Tolerated treatment well and working on gait today with focus on increasing speed and heel strike today  Patient demonstrated fatigue post treatment, exhibited good technique with therapeutic exercises, would benefit from continued PT and limited by shortness of breath      Plan: Continue per plan of care        Precautions: none      Manuals    MLD RLE             STM to patella tendon and quad tendon  15' 15' + PROM flex 15' JL JL CB JL KM    Prone knee flexion    5' JL JL CB JL KM                 Neuro Re-Ed                                       Supine heel slides home  20x 20x JL JL CB JL 20x JL                LAQ 20x 5: 20x           Seated knee extension and ankle DF stretch             Mini squats   STS 20x With 4\" black 20x JL        Ther Ex             Bike  10 min 15' 15' 15' 10' JL CB JL 12' JL   Foot on chair knee flexion stretch 10x10\"            VG DL squats      50% 20x CB 30x 30x JL   Leg press        SL 45# 2x10 SL 45# 2x10 JL   lunges          10x ea                                          Ther Activity             Chair sit to stands at table 20x                         Gait Training             Heel strike          10'                Modalities             hwave low setting 13' 15'                                  "

## 2023-06-23 ENCOUNTER — OFFICE VISIT (OUTPATIENT)
Dept: PHYSICAL THERAPY | Facility: CLINIC | Age: 78
End: 2023-06-23
Payer: MEDICARE

## 2023-06-23 DIAGNOSIS — Z96.651 TOTAL KNEE REPLACEMENT STATUS, RIGHT: Primary | ICD-10-CM

## 2023-06-23 DIAGNOSIS — M25.561 CHRONIC PAIN OF RIGHT KNEE: ICD-10-CM

## 2023-06-23 DIAGNOSIS — G89.29 CHRONIC PAIN OF RIGHT KNEE: ICD-10-CM

## 2023-06-23 PROCEDURE — 97110 THERAPEUTIC EXERCISES: CPT

## 2023-06-23 PROCEDURE — 97112 NEUROMUSCULAR REEDUCATION: CPT

## 2023-06-23 PROCEDURE — 97140 MANUAL THERAPY 1/> REGIONS: CPT

## 2023-06-23 NOTE — PROGRESS NOTES
"Daily Note     Today's date: 2023  Patient name: Juanjo Polanco  : 1945  MRN: [id number]  Referring provider: Claudio Bay DO  Dx:   Encounter Diagnosis     ICD-10-CM    1  Total knee replacement status, right  Z96 651       2  Chronic pain of right knee  M25 561     G89 29                      Subjective:  Pt cont's to report walking more without the cane  Pt c/o stiffness in the AM          Objective: See treatment diary below      Assessment: Tolerated treatment well  Good tolerance to manual therapy and exercise with decrease tightness and improved mobility reported post treatment  Gait improves with verbal cues for heel strike  Patient demonstrated fatigue post treatment, exhibited good technique with therapeutic exercises, would benefit from continued PT and limited by shortness of breath      Plan: Continue per plan of care        Precautions: none      Manuals    MLD RLE             STM to patella tendon and quad tendon GH 15' 15' + PROM flex 15' JL JL CB JL KM    Prone knee flexion    5' JL JL CB JL KM                 Neuro Re-Ed                                       Supine heel slides GH  20x 20x JL JL CB JL 20x JL                LAQ  5: 20x           Seated knee extension and ankle DF stretch             Mini squats   STS 20x With 4\" black 20x JL        Ther Ex             Bike  12 min 15' 15' 15' 10' JL CB JL 12' JL   Foot on chair knee flexion stretch             VG DL squats 50% x30     50% 20x CB 30x 30x JL   Leg press SL 45# 2x10       SL 45# 2x10 SL 45# 2x10 JL   lunges x10          10x ea                                          Ther Activity             Chair sit to stands at table                          Gait Training             Heel strike [facility]         10'                Modalities             hwave low setting  13'                                "

## 2023-06-27 ENCOUNTER — OFFICE VISIT (OUTPATIENT)
Dept: PHYSICAL THERAPY | Facility: CLINIC | Age: 78
End: 2023-06-27
Payer: MEDICARE

## 2023-06-27 DIAGNOSIS — M25.561 CHRONIC PAIN OF RIGHT KNEE: ICD-10-CM

## 2023-06-27 DIAGNOSIS — G89.29 CHRONIC PAIN OF RIGHT KNEE: ICD-10-CM

## 2023-06-27 DIAGNOSIS — Z96.651 TOTAL KNEE REPLACEMENT STATUS, RIGHT: Primary | ICD-10-CM

## 2023-06-27 PROCEDURE — 97112 NEUROMUSCULAR REEDUCATION: CPT

## 2023-06-27 PROCEDURE — 97110 THERAPEUTIC EXERCISES: CPT

## 2023-06-27 PROCEDURE — 97116 GAIT TRAINING THERAPY: CPT

## 2023-06-27 PROCEDURE — 97140 MANUAL THERAPY 1/> REGIONS: CPT

## 2023-06-27 NOTE — PROGRESS NOTES
"Daily Note     Today's date: 2023  Patient name: Clara Lowe  : 1945  MRN: [id number]  Referring provider: Wilma Barber DO  Dx:   Encounter Diagnosis     ICD-10-CM    1  Total knee replacement status, right  Z96 651       2  Chronic pain of right knee  M25 561     G89 29                      Subjective:  Pt cont's to report improvement overall with right knee mobility, strength and walking  Objective: See treatment diary below      Assessment: Tolerated treatment well  Good tolerance to manual therapy and progression of exercise with  decrease tightness and improved mobility reported post treatment  Gait improves with verbal cues for heel strike  Patient would benefit from continued PT  Plan: Continue per plan of care        Precautions: none      Manuals    MLD RLE             STM to patella tendon and quad tendon  [facility] GH also PROM 15' + PROM flex 15' JL JL CB JL KM    Prone knee flexion    5' JL JL CB JL KM                 Neuro Re-Ed                                       Supine heel slides  [facility] home 20x 20x JL JL CB JL 20x JL                LAQ  home           Seated knee extension and ankle DF stretch             Mini squats   STS 20x With 4\" black 20x JL        Ther Ex             Bike  12 min GH 15' 15' 10' JL CB JL 12' JL   Foot on chair knee flexion stretch             VG DL squats 50% x30 50%x4 min    50% 20x CB 30x 30x JL   Leg press SL 45# 2x10 45# x30      SL 45# 2x10 SL 45# 2x10 JL   lunges x10  GH        10x ea                                          Ther Activity             Chair sit to stands at table                          Gait Training             Heel strike  [facility] exaggerated gait at railing [address] [facility]        10'                Modalities             hwave low setting                                  "

## 2023-06-29 ENCOUNTER — OFFICE VISIT (OUTPATIENT)
Dept: PHYSICAL THERAPY | Facility: CLINIC | Age: 78
End: 2023-06-29
Payer: MEDICARE

## 2023-06-29 DIAGNOSIS — G89.29 CHRONIC PAIN OF RIGHT KNEE: ICD-10-CM

## 2023-06-29 DIAGNOSIS — M25.561 CHRONIC PAIN OF RIGHT KNEE: ICD-10-CM

## 2023-06-29 DIAGNOSIS — Z96.651 TOTAL KNEE REPLACEMENT STATUS, RIGHT: Primary | ICD-10-CM

## 2023-06-29 PROCEDURE — 97110 THERAPEUTIC EXERCISES: CPT | Performed by: PHYSICAL THERAPIST

## 2023-06-29 PROCEDURE — 97112 NEUROMUSCULAR REEDUCATION: CPT | Performed by: PHYSICAL THERAPIST

## 2023-06-29 PROCEDURE — 97140 MANUAL THERAPY 1/> REGIONS: CPT | Performed by: PHYSICAL THERAPIST

## 2023-06-29 NOTE — PROGRESS NOTES
"Daily Note     Today's date: 2023  Patient name: Camilla Willson  : 1945  MRN: [id number]  Referring provider: Sonja Nixon DO  Dx:   Encounter Diagnosis     ICD-10-CM    1  Total knee replacement status, right  Z96 651       2  Chronic pain of right knee  M25 561     G89 29                      Subjective: Noticing more knee stiffness since reducing PT frequency to 2x/wk  Walking without SPC at home and doing well overall  Objective: See treatment diary below      Assessment: Tolerated treatment well and still experiencing pain with end range flexion  [place] to benefit from cuing to hold stretches in flexed position longer    Patient would benefit from continued PT      Plan: Continue per plan of care        Precautions: none      Manuals    MLD RLE             STM to patella tendon and quad tendon GH JL also PROM 15' + PROM flex 15' JL JL CB JL KM    Prone knee flexion    5' JL JL CB JL KM                 Neuro Re-Ed                                       Supine heel slides  [facility] home 20x 20x JL JL CB JL 20x JL                LAQ  home           Seated knee extension and ankle DF stretch             Mini squats   STS 20x With 4\" black 20x JL        Ther Ex             Bike  12 min 15' 15' 15' 10' JL CB JL 12' JL   Foot on chair knee flexion stretch             VG DL squats 50% x30 50%x4 min    50% 20x CB 30x 30x JL   Leg press SL 45# 2x10 60# x30      SL 45# 2x10 SL 45# 2x10 JL   lunges x10  JL        10x ea                                          Ther Activity             Chair sit to stands at table                          Gait Training             Heel strike  [facility] exaggerated gait at railing  [facility]        10'                Modalities             hwave low setting                                    "

## 2023-07-03 ENCOUNTER — OFFICE VISIT (OUTPATIENT)
Dept: PHYSICAL THERAPY | Facility: CLINIC | Age: 78
End: 2023-07-03
Payer: MEDICARE

## 2023-07-03 DIAGNOSIS — G89.29 CHRONIC PAIN OF RIGHT KNEE: ICD-10-CM

## 2023-07-03 DIAGNOSIS — M25.561 CHRONIC PAIN OF RIGHT KNEE: ICD-10-CM

## 2023-07-03 DIAGNOSIS — Z96.651 TOTAL KNEE REPLACEMENT STATUS, RIGHT: Primary | ICD-10-CM

## 2023-07-03 PROCEDURE — 97110 THERAPEUTIC EXERCISES: CPT

## 2023-07-03 PROCEDURE — 97140 MANUAL THERAPY 1/> REGIONS: CPT

## 2023-07-03 PROCEDURE — 97112 NEUROMUSCULAR REEDUCATION: CPT

## 2023-07-03 NOTE — PROGRESS NOTES
Daily Note     Today's date: 7/3/2023  Patient name: Saumya Monk  : 1945  MRN: [id number]  Referring provider: Mesha Mayers DO  Dx:   Encounter Diagnosis     ICD-10-CM    1. Total knee replacement status, right  Z96.651       2. Chronic pain of right knee  M25.561     G89.29                      Subjective:   Pt c/o pain in opposite knee (left) the past few days. Pt c/o stiffness right knee. Objective: See treatment diary below      Assessment: Tolerated treatment well. Cont'd pain end range knee flexion. Decreased exercise today secondary to cold symptoms and SOB noted. Pt reported decrease knee pain/stiffness post treatment. Patient would benefit from continued PT      Plan: Continue per plan of care.       Precautions: none      Manuals 6/23 6/29 7/3 5/30 6/2 6/5 6/6 6/12 6/14 6/21   MLD RLE             STM to patella tendon and quad tendon GH JL also PROM [address] [place] also PROM 15' JL JL CB JL KM    Prone knee flexion    5' JL JL CB JL KM                 Neuro Re-Ed                                       Supine heel slides [address] [place] home  20x JL JL CB JL 20x JL                LAQ  home           Seated knee extension and ankle DF stretch             Mini squats    With 4" black 20x JL        Ther Ex             Bike  12 min 15' 10' 15' 10' JL CB JL 12' JL   Foot on chair knee flexion stretch             VG DL squats 50% x30 50%x4 min 50% x3 min   50% 20x CB 30x 30x JL   Leg press SL 45# 2x10 60# x30 60# 3x10     SL 45# 2x10 SL 45# 2x10 JL   lunges x10  JL        10x ea                                          Ther Activity             Chair sit to stands at table                          Gait Training             Heel strike [address] [place] exaggerated gait at railing [address] [place]        10'                Modalities             hwave low setting

## 2023-07-06 ENCOUNTER — APPOINTMENT (OUTPATIENT)
Dept: PHYSICAL THERAPY | Facility: CLINIC | Age: 78
End: 2023-07-06
Payer: MEDICARE

## 2023-07-10 ENCOUNTER — OFFICE VISIT (OUTPATIENT)
Dept: PHYSICAL THERAPY | Facility: CLINIC | Age: 78
End: 2023-07-10
Payer: MEDICARE

## 2023-07-10 DIAGNOSIS — M25.561 CHRONIC PAIN OF RIGHT KNEE: ICD-10-CM

## 2023-07-10 DIAGNOSIS — Z96.651 TOTAL KNEE REPLACEMENT STATUS, RIGHT: Primary | ICD-10-CM

## 2023-07-10 DIAGNOSIS — G89.29 CHRONIC PAIN OF RIGHT KNEE: ICD-10-CM

## 2023-07-10 PROCEDURE — 97110 THERAPEUTIC EXERCISES: CPT

## 2023-07-10 PROCEDURE — 97140 MANUAL THERAPY 1/> REGIONS: CPT

## 2023-07-10 NOTE — PROGRESS NOTES
Daily Note     Today's date: 7/10/2023  Patient name: Chung Snow  : 1945  MRN: [id number]  Referring provider: Aleta Cisneros DO  Dx:   Encounter Diagnosis     ICD-10-CM    1. Total knee replacement status, right  Z96.651       2. Chronic pain of right knee  M25.561     G89.29             Subjective:  Patient noted he is still recovering from pneumonia currently on an antibodic. Objective: See treatment diary below      Assessment: Tolerated treatment fair. Due to patient still recovering from pneumonia, modified treatment to include bike,  leg press, and PROM with STM. Patient responded well to treatment and monitored O2 throughout treatment. Patient would benefit from continued PT      Plan: Continue per plan of care.       Precautions: none      Manuals 6/23 6/29 7/3 7/10 6/2 6/5 6/6 6/12 6/14 6/21   MLD RLE             STM to patella tendon and quad tendon GH JL also PROM GH also PROM AC JL JL CB JL KM    Prone knee flexion     JL JL CB JL KM                 Neuro Re-Ed                                       Supine heel slides [address] [place] home   JL JL CB JL 20x JL                LAQ  home           Seated knee extension and ankle DF stretch             Mini squats     JL        Ther Ex             Bike  12 min 15' 10' 10' 10' JL CB JL 12' JL   Foot on chair knee flexion stretch             VG DL squats 50% x30 50%x4 min 50% x3 min   50% 20x CB 30x 30x JL   Leg press SL 45# 2x10 60# x30 60# 3x10 60#   2 x 10    SL 45# 2x10 SL 45# 2x10 JL   lunges x10  JL        10x ea                                          Ther Activity             Chair sit to stands at table                          Gait Training             Heel strike [address] exaggerated gait at railing [address] [place] [place]        10'                Modalities             hwave low setting

## 2023-07-12 ENCOUNTER — OFFICE VISIT (OUTPATIENT)
Dept: PHYSICAL THERAPY | Facility: CLINIC | Age: 78
End: 2023-07-12
Payer: MEDICARE

## 2023-07-12 DIAGNOSIS — Z96.651 TOTAL KNEE REPLACEMENT STATUS, RIGHT: Primary | ICD-10-CM

## 2023-07-12 DIAGNOSIS — M25.561 CHRONIC PAIN OF RIGHT KNEE: ICD-10-CM

## 2023-07-12 DIAGNOSIS — G89.29 CHRONIC PAIN OF RIGHT KNEE: ICD-10-CM

## 2023-07-12 PROCEDURE — 97110 THERAPEUTIC EXERCISES: CPT | Performed by: PHYSICAL THERAPIST

## 2023-07-12 PROCEDURE — 97112 NEUROMUSCULAR REEDUCATION: CPT | Performed by: PHYSICAL THERAPIST

## 2023-07-12 NOTE — PROGRESS NOTES
Daily Note     Today's date: 2023  Patient name: Yeyo Whalen  : 1945  MRN: [id number]  Referring provider: Clarke Mistry DO  Dx:   Encounter Diagnosis     ICD-10-CM    1. Total knee replacement status, right  Z96.651       2. Chronic pain of right knee  M25.561     G89.29                      Subjective: Progressing well s/p pneumonia, O2 levels largely remained above 95%      Objective: See treatment diary below      Assessment: Tolerated treatment well. Patient demonstrated fatigue post treatment and would benefit from continued PT      Plan: Continue per plan of care. Progress treatment as tolerated.        Precautions: none      Manuals 6/23 6/29 7/3 7/10 7/12        MLD RLE             STM to patella tendon and quad tendon GH JL also PROM [facility] also PROM AC         Prone knee flexion                          Neuro Re-Ed             Heel slide with strap     10:x20                     Supine heel slides [facility] home           SLR     3# 10:x15        Bridges     10:x10        Seated knee extension and ankle DF stretch             Mini squats             Ther Ex             Bike  12 min 15' 10' 10' 10'        Foot on chair knee flexion stretch             VG DL squats 50% x30 50%x4 min 50% x3 min  50% 2'        Leg press SL 45# 2x10 60# x30 60# 3x10 60#   2 x 10 60# 3x10        lunges x10  JL   10x ea                                               Ther Activity             Chair sit to stands at table                          Gait Training             Heel strike [facility] exaggerated gait at railing [facility]                        Modalities             hwave low setting

## 2023-07-18 ENCOUNTER — OFFICE VISIT (OUTPATIENT)
Dept: PHYSICAL THERAPY | Facility: CLINIC | Age: 78
End: 2023-07-18
Payer: MEDICARE

## 2023-07-18 DIAGNOSIS — M25.561 CHRONIC PAIN OF RIGHT KNEE: ICD-10-CM

## 2023-07-18 DIAGNOSIS — Z96.651 TOTAL KNEE REPLACEMENT STATUS, RIGHT: Primary | ICD-10-CM

## 2023-07-18 DIAGNOSIS — G89.29 CHRONIC PAIN OF RIGHT KNEE: ICD-10-CM

## 2023-07-18 PROCEDURE — 97110 THERAPEUTIC EXERCISES: CPT

## 2023-07-18 PROCEDURE — 97112 NEUROMUSCULAR REEDUCATION: CPT

## 2023-07-18 NOTE — PROGRESS NOTES
Daily Note     Today's date: 2023  Patient name: Keerthi Win  : 1945  MRN: [id number]  Referring provider: Marquis Weber DO  Dx:   Encounter Diagnosis     ICD-10-CM    1. Total knee replacement status, right  Z96.651       2. Chronic pain of right knee  M25.561     G89.29                      Subjective: Pt had urologist appt yesterday and catheter was removed. Pt c/o LE discomfort L>R. Objective: See treatment diary below      Assessment: Tolerated treatment well. Mild increase edema/redness noted b/l LE's. Pt's daughter will monitor and contact MD if needed. Good tolerance to exercise as noted with minimal fatigue and SOB. HR/O2 levels monitored throughout session and remained WNL. Pt reported decrease LE pain/tightness post treatment. Patient demonstrated fatigue post treatment and would benefit from continued PT      Plan: Continue per plan of care. Progress treatment as tolerated.        Precautions: none      Manuals 6/23 6/29 7/3 7/10 7/12 7/18       MLD RLE             STM to patella tendon and quad tendon GH JL also PROM [address] [place] also PROM AC         Prone knee flexion                          Neuro Re-Ed             Heel slide with strap     10:x20 GH                    Supine heel slides [address] [place] [place] home           SLR     3# 10:x15 [address] [place] [place]       Bridges     10:x10 [address] [place]       LAQ      3# 5"x15       Mini squats             Ther Ex             Bike  12 min 15' 10' 10' 10' 10'       Foot on chair knee flexion stretch             VG DL squats 50% x30 50%x4 min 50% x3 min  50% 2' [address] [place] [place]       Leg press SL 45# 2x10 60# x30 60# 3x10 60#   2 x 10 60# 3x10 GH       lunges x10  JL   10x ea                                               Ther Activity             Chair sit to stands at table                          Gait Training             Heel strike [address] [place] [place] exaggerated gait at railing [address] [place]                        Modalities             hwave low setting

## 2023-07-20 ENCOUNTER — OFFICE VISIT (OUTPATIENT)
Dept: PHYSICAL THERAPY | Facility: CLINIC | Age: 78
End: 2023-07-20
Payer: MEDICARE

## 2023-07-20 DIAGNOSIS — Z96.651 TOTAL KNEE REPLACEMENT STATUS, RIGHT: Primary | ICD-10-CM

## 2023-07-20 DIAGNOSIS — G89.29 CHRONIC PAIN OF RIGHT KNEE: ICD-10-CM

## 2023-07-20 DIAGNOSIS — M25.561 CHRONIC PAIN OF RIGHT KNEE: ICD-10-CM

## 2023-07-20 PROCEDURE — 97112 NEUROMUSCULAR REEDUCATION: CPT

## 2023-07-20 PROCEDURE — 97110 THERAPEUTIC EXERCISES: CPT

## 2023-07-20 NOTE — PROGRESS NOTES
Daily Note     Today's date: 2023  Patient name: Vince Saxena  : 1945  MRN: [id number]  Referring provider: Brad Larios DO  Dx:   Encounter Diagnosis     ICD-10-CM    1. Total knee replacement status, right  Z96.651       2. Chronic pain of right knee  M25.561     G89.29                      Subjective: Pt reports feeling good after last visit with decrease LE tightness. No change in LE swelling. Objective: See treatment diary below      Assessment: Tolerated treatment well. Good tolerance to exercise with short rest periods provided and decrease LE tightness reported post treatment. HR/O2 levels monitored during treatment and maintained WNL. Pt progressing steadily with right knee mobility and strength. Patient demonstrated fatigue post treatment and would benefit from continued PT      Plan: Continue per plan of care. Progress treatment as tolerated.        Precautions: none      Manuals 6/23 6/29 7/3 7/10 7/12 7/18 7/20      MLD RLE             STM to patella tendon and quad tendon GH JL also PROM [facility] also PROM AC         Prone knee flexion                          Neuro Re-Ed             Heel slide with strap     10:x20 [facility] GH                   Supine heel slides [facility] home           SLR     3# 10:x15 [facility] 3# 2x10      Bridges     10:x10 [facility]       LAQ      3# 5"x15 4# 2x10      Mini squats             Ther Ex             Bike  12 min 15' 10' 10' 10' 10' 10'      Foot on chair knee flexion stretch             VG DL squats 50% x30 50%x4 min 50% x3 min  50% 2' GH 50% x4 min      Leg press SL 45# 2x10 60# x30 60# 3x10 60#   2 x 10 60# 3x10 GH GH      lunges x10  JL   10x ea                                               Ther Activity             Chair sit to stands at table                          Gait Training             Heel strike [facility] exaggerated gait at railing [facility]                        Modalities             hwave low setting

## 2023-07-24 ENCOUNTER — OFFICE VISIT (OUTPATIENT)
Dept: PHYSICAL THERAPY | Facility: CLINIC | Age: 78
End: 2023-07-24
Payer: MEDICARE

## 2023-07-24 DIAGNOSIS — G89.29 CHRONIC PAIN OF RIGHT KNEE: ICD-10-CM

## 2023-07-24 DIAGNOSIS — Z96.651 TOTAL KNEE REPLACEMENT STATUS, RIGHT: Primary | ICD-10-CM

## 2023-07-24 DIAGNOSIS — M25.561 CHRONIC PAIN OF RIGHT KNEE: ICD-10-CM

## 2023-07-24 PROCEDURE — 97110 THERAPEUTIC EXERCISES: CPT | Performed by: PHYSICAL THERAPIST

## 2023-07-24 PROCEDURE — 97112 NEUROMUSCULAR REEDUCATION: CPT | Performed by: PHYSICAL THERAPIST

## 2023-07-24 NOTE — PROGRESS NOTES
Daily Note     Today's date: 2023  Patient name: Tiffanie Jackson  : 1945  MRN: [id number]  Referring provider: Kwame Childs DO  Dx:   Encounter Diagnosis     ICD-10-CM    1. Total knee replacement status, right  Z96.651       2. Chronic pain of right knee  M25.561     G89.29                      Subjective: Pt reports pain recently in his left knee but the right has been doing well. Objective: See treatment diary below      Assessment: Tolerated treatment well. Patient would benefit from continued PT. Pt with minimal pain throughout session. He continues with ext/flexion ROM deficits and quad weakness. Plan: Continue per plan of care.       Precautions: none      Manuals 6/23 6/29 7/3 7/10 7/12 7/18 7/20 7/24     MLD RLE             STM to patella tendon and quad tendon GH JL also PROM [address] [place] also PROM AC         Prone knee flexion                          Neuro Re-Ed             Heel slide with strap     10:x20 [address] [place] GH CB                  Supine heel slides [address] [place] home           SLR     3# 10:x15 [address] [place] 3# 2x10 CB     Bridges     10:x10 [address] [place] [place]       LAQ      3# 5"x15 4# 2x10 CB     Mini squats             Ther Ex             Bike  12 min 15' 10' 10' 10' 10' 10' 10'     Foot on chair knee flexion stretch             VG DL squats 50% x30 50%x4 min 50% x3 min  50% 2' GH 50% x4 min CB     Leg press SL 45# 2x10 60# x30 60# 3x10 60#   2 x 10 60# 3x10 GH GH CB     lunges x10  JL   10x ea                                               Ther Activity             Chair sit to stands at table                          Gait Training             Heel strike [address] [place] exaggerated gait at railing [address] [place]                        Modalities             hwave low setting

## 2023-07-27 ENCOUNTER — OFFICE VISIT (OUTPATIENT)
Dept: PHYSICAL THERAPY | Facility: CLINIC | Age: 78
End: 2023-07-27
Payer: MEDICARE

## 2023-07-27 DIAGNOSIS — G89.29 CHRONIC PAIN OF RIGHT KNEE: ICD-10-CM

## 2023-07-27 DIAGNOSIS — Z96.651 TOTAL KNEE REPLACEMENT STATUS, RIGHT: Primary | ICD-10-CM

## 2023-07-27 DIAGNOSIS — M25.561 CHRONIC PAIN OF RIGHT KNEE: ICD-10-CM

## 2023-07-27 PROCEDURE — 97112 NEUROMUSCULAR REEDUCATION: CPT

## 2023-07-27 PROCEDURE — 97110 THERAPEUTIC EXERCISES: CPT

## 2023-07-27 NOTE — PROGRESS NOTES
Daily Note     Today's date: 2023  Patient name: Popeye Parham  : 1945  MRN: [id number]  Referring provider: Ashley Knowles DO  Dx:   Encounter Diagnosis     ICD-10-CM    1. Total knee replacement status, right  Z96.651       2. Chronic pain of right knee  M25.561     G89.29                      Subjective: Patient offers no new complaints. States he does have some soreness / discomfort in his knee pre treatment session. Objective: See treatment diary below      Assessment: Tolerated treatment well. Patient adequately challenged with all exercises. Min verbal cueing needed for technique throughout session. Improvements in stiffness reported post session. Patient exhibited good technique with therapeutic exercises and would benefit from continued PT      Plan: Continue per plan of care. Progress treatment as tolerated.        Precautions: none      Manuals 6/23 6/29 7/3 7/10 7/12 7/18 7/20 7/24 7/27    MLD RLE             STM to patella tendon and quad tendon GH JL also PROM [address] [place] also PROM AC         Prone knee flexion                          Neuro Re-Ed             Heel slide with strap     10:x20 [address] [place] GH CB 10s x 10                 Supine heel slides [address] [place] [place] home           SLR     3# 10:x15 [address] [place] [place] 3# 2x10 CB 3# 2x10    Bridges     10:x10 [address] [place] [place]       LAQ      3# 5"x15 4# 2x10 CB 4# 2x10    Mini squats             Ther Ex             Bike  12 min 15' 10' 10' 10' 10' 10' 10' 10 min    Foot on chair knee flexion stretch             VG DL squats 50% x30 50%x4 min 50% x3 min  50% 2' GH 50% x4 min CB 50% 4 min    Leg press SL 45# 2x10 60# x30 60# 3x10 60#   2 x 10 60# 3x10 GH GH CB 60# 3x10    lunges x10  JL   10x ea                                               Ther Activity             Chair sit to stands at table                          Gait Training             Heel strike [address] [place] exaggerated gait at railing [address] [place] [place]                        Modalities             hwave low setting

## 2023-07-31 ENCOUNTER — OFFICE VISIT (OUTPATIENT)
Dept: PHYSICAL THERAPY | Facility: CLINIC | Age: 78
End: 2023-07-31
Payer: MEDICARE

## 2023-07-31 DIAGNOSIS — G89.29 CHRONIC PAIN OF RIGHT KNEE: ICD-10-CM

## 2023-07-31 DIAGNOSIS — M25.561 CHRONIC PAIN OF RIGHT KNEE: ICD-10-CM

## 2023-07-31 DIAGNOSIS — Z96.651 TOTAL KNEE REPLACEMENT STATUS, RIGHT: Primary | ICD-10-CM

## 2023-07-31 PROCEDURE — 97112 NEUROMUSCULAR REEDUCATION: CPT | Performed by: PHYSICAL THERAPIST

## 2023-07-31 PROCEDURE — 97530 THERAPEUTIC ACTIVITIES: CPT | Performed by: PHYSICAL THERAPIST

## 2023-07-31 PROCEDURE — 97110 THERAPEUTIC EXERCISES: CPT | Performed by: PHYSICAL THERAPIST

## 2023-07-31 NOTE — PROGRESS NOTES
Daily Note     Today's date: 2023  Patient name: Santy Kuhn  : 1945  MRN: [id number]  Referring provider: Lucille Murrieta DO  Dx:   Encounter Diagnosis     ICD-10-CM    1. Total knee replacement status, right  Z96.651       2. Chronic pain of right knee  M25.561     G89.29                      Subjective: More limited by left knee than his right      Objective: See treatment diary below      Assessment: Tolerated treatment well.  Patient exhibited good technique with therapeutic exercises      Plan: Beginning planning transition to HEP     Precautions: none      Manuals 6/23 6/29 7/3 7/10 7/12 7/18 7/20 7/24 7/31    MLD RLE             STM to patella tendon and quad tendon GH JL also PROM [address] [place] also PROM AC         Prone knee flexion                          Neuro Re-Ed             Heel slide with strap     10:x20 [address] [place] GH CB 10s x 10                 Supine heel slides [address] [place] home           SLR     3# 10:x15 [address] [place] 3# 2x10 CB 3# 2x10    Bridges     10:x10 [address] [place] [place]       LAQ      3# 5"x15 4# 2x10 CB 4# 2x10    Mini squats             Ther Ex             Bike  12 min 15' 10' 10' 10' 10' 10' 10' 10 min 10'   Foot on chair knee flexion stretch             VG DL squats 50% x30 50%x4 min 50% x3 min  50% 2' GH 50% x4 min CB 50% 4 min    Leg press SL 45# 2x10 60# x30 60# 3x10 60#   2 x 10 60# 3x10 GH GH CB 60# 3x10    lunges x10  JL   10x ea     10x                                          Ther Activity             Chair sit to stands at table                          Gait Training             Heel strike [address] [place] exaggerated gait at railing [address] [place]                        Modalities             hwave low setting

## 2023-08-03 ENCOUNTER — OFFICE VISIT (OUTPATIENT)
Dept: PHYSICAL THERAPY | Facility: CLINIC | Age: 78
End: 2023-08-03
Payer: MEDICARE

## 2023-08-03 DIAGNOSIS — G89.29 CHRONIC PAIN OF RIGHT KNEE: ICD-10-CM

## 2023-08-03 DIAGNOSIS — M25.561 CHRONIC PAIN OF RIGHT KNEE: ICD-10-CM

## 2023-08-03 DIAGNOSIS — Z96.651 TOTAL KNEE REPLACEMENT STATUS, RIGHT: Primary | ICD-10-CM

## 2023-08-03 PROCEDURE — 97112 NEUROMUSCULAR REEDUCATION: CPT | Performed by: PHYSICAL THERAPIST

## 2023-08-03 PROCEDURE — 97530 THERAPEUTIC ACTIVITIES: CPT | Performed by: PHYSICAL THERAPIST

## 2023-08-03 PROCEDURE — 97110 THERAPEUTIC EXERCISES: CPT | Performed by: PHYSICAL THERAPIST

## 2023-08-03 NOTE — PROGRESS NOTES
Daily Note     Today's date: 8/3/2023  Patient name: Jose Guadalupe Junior  : 1945  MRN: [id number]  Referring provider: Anali Clements DO  Dx:   Encounter Diagnosis     ICD-10-CM    1. Total knee replacement status, right  Z96.651       2. Chronic pain of right knee  M25.561     G89.29                      Subjective: Most limited by left knee pain and stiffness. Objective: See treatment diary below      Assessment: Tolerated treatment fair.  Patient would benefit from continued PT      Plan: Begin transitioning to HEP, consider 1 more week of PT     Precautions: none      Manuals 6/23 6/29 7/3 7/10 7/12 7/18 7/20 7/24 7/31 8/2   MLD RLE             STM to patella tendon and quad tendon GH JL also PROM [address] [place] also PROM AC         Prone knee flexion                          Neuro Re-Ed             Heel slide with strap     10:x20 GH GH CB 10s x 10                 Supine heel slides [address] [place] home           SLR     3# 10:x15 [address] [place] 3# 2x10 CB 3# 2x10    Bridges     10:x10 [address] [place]       LAQ      3# 5"x15 4# 2x10 CB 4# 2x10    Mini squats             Ther Ex             Bike  12 min 15' 10' 10' 10' 10' 10' 10' 10 min 10'   Foot on chair knee flexion stretch             VG DL squats 50% x30 50%x4 min 50% x3 min  50% 2' GH 50% x4 min CB 50% 4 min 2'   Leg press SL 45# 2x10 60# x30 60# 3x10 60#   2 x 10 60# 3x10 GH GH CB 60# 3x10    lunges x10  JL   10x ea     10x   HR/TR          20x   Func march          10x ea                Ther Activity             Chair sit to stands at table                          Gait Training             Heel strike [address] [place] exaggerated gait at railing [address] [place]                        Modalities             hwave low setting

## 2023-08-07 ENCOUNTER — OFFICE VISIT (OUTPATIENT)
Dept: PHYSICAL THERAPY | Facility: CLINIC | Age: 78
End: 2023-08-07
Payer: MEDICARE

## 2023-08-07 DIAGNOSIS — G89.29 CHRONIC PAIN OF RIGHT KNEE: ICD-10-CM

## 2023-08-07 DIAGNOSIS — M25.561 CHRONIC PAIN OF RIGHT KNEE: ICD-10-CM

## 2023-08-07 DIAGNOSIS — Z96.651 TOTAL KNEE REPLACEMENT STATUS, RIGHT: Primary | ICD-10-CM

## 2023-08-07 PROCEDURE — 97110 THERAPEUTIC EXERCISES: CPT

## 2023-08-07 PROCEDURE — 97112 NEUROMUSCULAR REEDUCATION: CPT

## 2023-08-07 PROCEDURE — 97530 THERAPEUTIC ACTIVITIES: CPT

## 2023-08-07 NOTE — PROGRESS NOTES
Daily Note     Today's date: 2023  Patient name: Khalida Lee  : 1945  MRN: [id number]  Referring provider: Lubna Tatum DO  Dx:   Encounter Diagnosis     ICD-10-CM    1. Total knee replacement status, right  Z96.651       2. Chronic pain of right knee  M25.561     G89.29                      Subjective: Pt reports he is doing well. Objective: See treatment diary below      Assessment: Tolerated treatment well. Pt performed all exercises without issue, continue to progress to tolerance. Pt would benefit from continued focus on strength and ROM exercises. Pt required occasional verba cues for correct form with exercises. Patient demonstrated fatigue post treatment, exhibited good technique with therapeutic exercises and would benefit from continued PT      Plan: Continue per plan of care.       Precautions: none      Manuals 8/7 6/29 7/3 7/10 7/12 7/18 7/20 7/24 7/31 8   MLD RLE             STM to patella tendon and quad tendon  JL also PROM GH also PROM AC         Prone knee flexion                          Neuro Re-Ed             Heel slide with strap     10:x20 [facility] GH CB 10s x 10                 Supine heel slides  home           SLR 3# 2x10    3# 10:x15 [facility] 3# 2x10 CB 3# 2x10    Bridges     10:x10 [facility]       LAQ 4# 2x10     3# 5"x15 4# 2x10 CB 4# 2x10    Mini squats             Ther Ex             Bike  10' 15' 10' 10' 10' 10' 10' 10' 10 min 10'   Foot on chair knee flexion stretch             VG DL squats 50% 3' 50%x4 min 50% x3 min  50% 2' GH 50% x4 min CB 50% 4 min 2'   Leg press SL 60# 3x10 60# x30 60# 3x10 60#   2 x 10 60# 3x10 GH GH CB 60# 3x10    lunges 10x ea JL   10x ea     10x   HR/TR 20x ea         20x   Func march 10x ea         10x ea                Ther Activity             Chair sit to stands at table                          Gait Training             Heel strike  exaggerated gait at railing [facility]                        Modalities             hwave low setting

## 2023-08-10 ENCOUNTER — OFFICE VISIT (OUTPATIENT)
Dept: PHYSICAL THERAPY | Facility: CLINIC | Age: 78
End: 2023-08-10
Payer: MEDICARE

## 2023-08-10 DIAGNOSIS — Z96.651 TOTAL KNEE REPLACEMENT STATUS, RIGHT: Primary | ICD-10-CM

## 2023-08-10 DIAGNOSIS — M25.561 CHRONIC PAIN OF RIGHT KNEE: ICD-10-CM

## 2023-08-10 DIAGNOSIS — G89.29 CHRONIC PAIN OF RIGHT KNEE: ICD-10-CM

## 2023-08-10 PROCEDURE — 97110 THERAPEUTIC EXERCISES: CPT

## 2023-08-10 PROCEDURE — 97530 THERAPEUTIC ACTIVITIES: CPT

## 2023-08-10 PROCEDURE — 97112 NEUROMUSCULAR REEDUCATION: CPT

## 2023-08-10 NOTE — PROGRESS NOTES
Daily Note     Today's date: 8/10/2023  Patient name: Olu Richey  : 1945   MRN: [id number]  Referring provider: Lizzie Leung DO  Dx:   Encounter Diagnosis     ICD-10-CM    1. Total knee replacement status, right  Z96.651       2. Chronic pain of right knee  M25.561     G89.29                      Subjective: Patient noted no new complaints. Objective: See treatment diary below      Assessment: Tolerated treatment fair. Patient performed exercises with correct form and no complaints. Plan: primary therapist D/C patient to HEP.       Precautions: none      Manuals 8/7 8/10  7/10 7/12 7/18 7/20 7/24 7/31 8/2   MLD RLE             STM to patella tendon and quad tendon    AC         Prone knee flexion                          Neuro Re-Ed             Heel slide with strap     10:x20 [address] [place] GH CB 10s x 10                 Supine heel slides             SLR 3# 2x10 3# 2 x 10    3# 10:x15 [address] [place] 3# 2x10 CB 3# 2x10    Bridges     10:x10 [address] [place]       LAQ 4# 2x10 4# 2 x 10    3# 5"x15 4# 2x10 CB 4# 2x10    Mini squats             Ther Ex             Bike  10' 10'  10' 10' 10' 10' 10' 10 min 10'   Foot on chair knee flexion stretch             VG DL squats 50% 3' 50% 3'   50% 2' GH 50% x4 min CB 50% 4 min 2'   Leg press SL 60# 3x10 60# 3x10  60#   2 x 10 60# 3x10 GH GH CB 60# 3x10    lunges 10x ea 10 x ea    10x ea     10x   HR/TR 20x ea 20x ea        20x   Func march 10x ea 10x ea        10x ea                Ther Activity             Chair sit to stands at table                          Gait Training             Heel strike                          Modalities             hwave low setting

## 2023-08-14 ENCOUNTER — APPOINTMENT (OUTPATIENT)
Dept: PHYSICAL THERAPY | Facility: CLINIC | Age: 78
End: 2023-08-14
Payer: MEDICARE

## 2023-08-16 ENCOUNTER — APPOINTMENT (OUTPATIENT)
Dept: PHYSICAL THERAPY | Facility: CLINIC | Age: 78
End: 2023-08-16
Payer: MEDICARE

## 2023-08-21 ENCOUNTER — APPOINTMENT (OUTPATIENT)
Dept: PHYSICAL THERAPY | Facility: CLINIC | Age: 78
End: 2023-08-21
Payer: MEDICARE

## 2023-08-24 ENCOUNTER — APPOINTMENT (OUTPATIENT)
Dept: PHYSICAL THERAPY | Facility: CLINIC | Age: 78
End: 2023-08-24
Payer: MEDICARE

## 2023-08-29 ENCOUNTER — APPOINTMENT (OUTPATIENT)
Dept: PHYSICAL THERAPY | Facility: CLINIC | Age: 78
End: 2023-08-29
Payer: MEDICARE

## 2023-08-31 ENCOUNTER — APPOINTMENT (OUTPATIENT)
Dept: PHYSICAL THERAPY | Facility: CLINIC | Age: 78
End: 2023-08-31
Payer: MEDICARE

## 2025-01-25 DIAGNOSIS — B35.3 TINEA PEDIS: ICD-10-CM

## 2025-03-13 ENCOUNTER — EVALUATION (OUTPATIENT)
Dept: PHYSICAL THERAPY | Facility: CLINIC | Age: 80
End: 2025-03-13
Payer: MEDICARE

## 2025-03-13 DIAGNOSIS — Z96.652 S/P TKR (TOTAL KNEE REPLACEMENT), LEFT: Primary | ICD-10-CM

## 2025-03-13 PROCEDURE — 97110 THERAPEUTIC EXERCISES: CPT | Performed by: PHYSICAL THERAPIST

## 2025-03-13 PROCEDURE — 97161 PT EVAL LOW COMPLEX 20 MIN: CPT | Performed by: PHYSICAL THERAPIST

## 2025-03-13 NOTE — LETTER
2025    Miguel Angel Sheppard DO  [address]  [address]  [facility] [address]    Patient: Neal Delgadillo   YOB: 1945   Date of Visit: 3/13/2025     Encounter Diagnosis     ICD-10-CM    1. S/P TKR (total knee replacement), left  Z96.652           Dear Dr. Sheppard:    Thank you for your recent referral of Neal Delgadillo. Please review the attached evaluation summary from Neal's recent visit.     Please verify that you agree with the plan of care by signing the attached order.     If you have any questions or concerns, please do not hesitate to call.     I sincerely appreciate the opportunity to share in the care of one of your patients and hope to have another opportunity to work with you in the near future.       Sincerely,    Eliecer Ludwig, PT      Referring Provider:      I certify that I have read the below Plan of Care and certify the need for these services furnished under this plan of treatment while under my care.                    Miguel Angel Sheppard DO  [address]  [address]  [facility] [address]  Via Fax: [phone]          PT Evaluation     Today's date: 3/13/2025  Patient name: Neal Delgadillo  : 1945  MRN: [id number]  Referring provider: Miguel Angel Sheppard DO  Dx:   Encounter Diagnosis     ICD-10-CM    1. S/P TKR (total knee replacement), left  Z96.652                      Assessment  Impairments: abnormal muscle firing, abnormal muscle tone, abnormal or restricted ROM, impaired physical strength, lacks appropriate home exercise program and pain with function    Assessment details: Neal Delgadillo is a pleasant 79 y.o. adult presents with L TKA. Focused session on safety awareness during transfers and appropriate sit to stand mechanics. HEP was given for knee ROM and strength.     No further referral appears necessary at this time.       Skilled PT services appropriate to facilitate return to prior level of function with  transition to home exercise program for independent management when appropriate.    Understanding of Dx/Px/POC: good     Prognosis: good    Goals  Patient will be able to manage symptoms independently  LT weeks  1. pt will reach foto predicted  2. pt will decrease worst pain 75%   ST weeks  1. Pt will decrease worst pain 50%  2. Patient will successfully manage HEP        Plan  Patient would benefit from: skilled PT  Referral necessary: No  Planned modality interventions: thermotherapy: hydrocollator packs    Planned therapy interventions: home exercise program, manual therapy, neuromuscular re-education, patient education, functional ROM exercises, strengthening, stretching, joint mobilization, graded activity, graded exercise, therapeutic exercise, body mechanics training, motor coordination training and activity modification    Frequency: 2x week  Duration in weeks: 12  Treatment plan discussed with: patient      Subjective Evaluation    History of Present Illness  Date of surgery: 3/10/2025  Mechanism of injury: Pt s/p L TKA. He notes local pain surrounding the knee. He denies numbness/tingling and calf pain. Pt caregiver notes he has been having some fear with moving and it was somewhat of a challenge getting to the clinic because of this. Walking, sit to stand and transferring has been a challenge. Living arrangement is first floor only with a few steps to enter. Next f/u with physician is 25 with Dr. Sheppard.   Patient Goals  Patient goals for therapy: decreased pain, increased strength and increased motion        Objective     Passive Range of Motion     Right Knee   Flexion: 60 degrees with pain  Extension: 10 degrees with pain    Strength/Myotome Testing     Left Knee   Flexion: 2+  Extension: 2+  Quadriceps contraction: poor    General Comments:      Knee Comments  Sit to stand: modA; requires cuing for safely positioning to sitting surface    Gait: CG with rolling walker    No calf tenderness  on palpation  Incision clean and dry             Precautions: none      Manuals 3/13                                                                Neuro Re-Ed                                                                                                        Ther Ex             bike nv            Seated heel slides reviewed            Sit to stand nv            Quad set reviewed            LAQ reviewed            Seated hamstring/gastroc stretch reviewed                                      Ther Activity             ambulation 5' RW                         Gait Training                                       Modalities

## 2025-03-13 NOTE — PROGRESS NOTES
PT Evaluation     Today's date: 3/13/2025  Patient name: Neal Delgadillo  : 1945  MRN: [id number]  Referring provider: Miguel Angel Sheppard DO  Dx:   Encounter Diagnosis     ICD-10-CM    1. S/P TKR (total knee replacement), left  Z96.652                      Assessment  Impairments: abnormal muscle firing, abnormal muscle tone, abnormal or restricted ROM, impaired physical strength, lacks appropriate home exercise program and pain with function    Assessment details: Neal Delgadillo is a pleasant 79 y.o. adult presents with L TKA. Focused session on safety awareness during transfers and appropriate sit to stand mechanics. HEP was given for knee ROM and strength.     No further referral appears necessary at this time.       Skilled PT services appropriate to facilitate return to prior level of function with transition to home exercise program for independent management when appropriate.    Understanding of Dx/Px/POC: good     Prognosis: good    Goals  Patient will be able to manage symptoms independently  LT weeks  1. pt will reach foto predicted  2. pt will decrease worst pain 75%   ST weeks  1. Pt will decrease worst pain 50%  2. Patient will successfully manage HEP        Plan  Patient would benefit from: skilled PT  Referral necessary: No  Planned modality interventions: thermotherapy: hydrocollator packs    Planned therapy interventions: home exercise program, manual therapy, neuromuscular re-education, patient education, functional ROM exercises, strengthening, stretching, joint mobilization, graded activity, graded exercise, therapeutic exercise, body mechanics training, motor coordination training and activity modification    Frequency: 2x week  Duration in weeks: 12  Treatment plan discussed with: patient      Subjective Evaluation    History of Present Illness  Date of surgery: 3/10/2025  Mechanism of injury: Pt s/p L TKA. He notes local pain surrounding the knee. He denies  numbness/tingling and calf pain. Pt caregiver notes he has been having some fear with moving and it was somewhat of a challenge getting to the clinic because of this. Walking, sit to stand and transferring has been a challenge. Living arrangement is first floor only with a few steps to enter. Next f/u with physician is 4/8/25 with Dr. Sheppard.   Patient Goals  Patient goals for therapy: decreased pain, increased strength and increased motion        Objective     Passive Range of Motion     Right Knee   Flexion: 60 degrees with pain  Extension: 10 degrees with pain    Strength/Myotome Testing     Left Knee   Flexion: 2+  Extension: 2+  Quadriceps contraction: poor    General Comments:      Knee Comments  Sit to stand: modA; requires cuing for safely positioning to sitting surface    Gait: CG with rolling walker    No calf tenderness on palpation  Incision clean and dry             Precautions: none      Manuals 3/13                                                                Neuro Re-Ed                                                                                                        Ther Ex             bike nv            Seated heel slides reviewed            Sit to stand nv            Quad set reviewed            LAQ reviewed            Seated hamstring/gastroc stretch reviewed                                      Ther Activity             ambulation 5' RW                         Gait Training                                       Modalities

## 2025-03-17 ENCOUNTER — OFFICE VISIT (OUTPATIENT)
Dept: PHYSICAL THERAPY | Facility: CLINIC | Age: 80
End: 2025-03-17
Payer: MEDICARE

## 2025-03-17 DIAGNOSIS — Z96.652 S/P TKR (TOTAL KNEE REPLACEMENT), LEFT: Primary | ICD-10-CM

## 2025-03-17 PROCEDURE — 97110 THERAPEUTIC EXERCISES: CPT

## 2025-03-17 PROCEDURE — 97112 NEUROMUSCULAR REEDUCATION: CPT

## 2025-03-17 NOTE — PROGRESS NOTES
"Daily Note     Today's date: 3/17/2025  Patient name: Neal Delgadillo  : 1945  MRN: [id number]  Referring provider: Miguel Angel Sheppard DO  Dx:   Encounter Diagnosis     ICD-10-CM    1. S/P TKR (total knee replacement), left  Z96.652                      Subjective: Pt reports feeling better since IE and performing HEP 2x/day with good tolerance.       Objective: See treatment diary below      Assessment: Tolerated treatment well. Pt is 1 week post op.  Gait with RW and transfers improved with decrease assist required.  Incision clean and dry and no calf tenderness on palpation.   Knee flexion PROM to 80 degrees and full knee extension.  Good tolerance to exercise with verbal/tactile cues provided.  Pt denied increase pain post treatment.   Patient would benefit from continued PT      Plan: Continue per plan of care.      Precautions: none      Manuals 3/13 3/17                                                               Neuro Re-Ed                                                                                                        Ther Ex             bike nv nv           Supine heel slide   10\"x 10 with manual A           Seated heel slide reviewed 10\"x  10           Sit to stand nv Pt education x5            Quad set reviewed 5\"x10 with tc's            LAQ reviewed 5\"x10           Seated hamstring/gastroc stretch reviewed 10\"x 10                                     Ther Activity             ambulation 5' RW                         Gait Training                                       Modalities                                            "

## 2025-03-21 ENCOUNTER — OFFICE VISIT (OUTPATIENT)
Dept: PHYSICAL THERAPY | Facility: CLINIC | Age: 80
End: 2025-03-21
Payer: MEDICARE

## 2025-03-21 DIAGNOSIS — Z96.652 S/P TKR (TOTAL KNEE REPLACEMENT), LEFT: Primary | ICD-10-CM

## 2025-03-21 PROCEDURE — 97112 NEUROMUSCULAR REEDUCATION: CPT

## 2025-03-21 PROCEDURE — 97140 MANUAL THERAPY 1/> REGIONS: CPT

## 2025-03-21 PROCEDURE — 97110 THERAPEUTIC EXERCISES: CPT

## 2025-03-21 NOTE — PROGRESS NOTES
"Daily Note     Today's date: 3/21/2025  Patient name: Neal Delgadillo  : 1945  MRN: [id number]  Referring provider: Miguel Angel Sheppard DO  Dx:   Encounter Diagnosis     ICD-10-CM    1. S/P TKR (total knee replacement), left  Z96.652                      Subjective: Pt reports compliance with HEP.   Minimal c/o pain left knee.        Objective: See treatment diary below      Assessment: Tolerated treatment well. Pt is 1 week/4 days post op.  Gait with RW and sit to stand transfer improved with verbal cues.   Incision clean and dry and no calf tenderness on palpation.   Knee flexion PROM to 90 degrees seated and full knee extension.  Good tolerance to progression of exercise with verbal/tactile cues provided.  Pt denied increase pain post treatment.   Patient would benefit from continued PT      Plan: Continue per plan of care.      Precautions: none      Manuals 3/13 3/17 3/21          PROM knee flexion/ext    GH                                                 Neuro Re-Ed                                                                                                        Ther Ex             bike nv nv nv          Supine heel slide   10\"x 10 with manual A GH          Seated heel slide reviewed 10\"x  10 GH          Sit to stand nv Pt education x5  GH          Quad set/SAQ reviewed 5\"x10 with tc's  10\"x 10 with tc's          LAQ reviewed 5\"x10 10\"x 10          Seated hamstring/gastroc stretch reviewed 10\"x 10 GH          SLR   X10 with tc's                       Ther Activity             ambulation 5' RW  GH                       Gait Training                                       Modalities                                            "

## 2025-03-25 ENCOUNTER — OFFICE VISIT (OUTPATIENT)
Dept: PHYSICAL THERAPY | Facility: CLINIC | Age: 80
End: 2025-03-25
Payer: MEDICARE

## 2025-03-25 DIAGNOSIS — Z96.652 S/P TKR (TOTAL KNEE REPLACEMENT), LEFT: Primary | ICD-10-CM

## 2025-03-25 PROCEDURE — 97112 NEUROMUSCULAR REEDUCATION: CPT

## 2025-03-25 PROCEDURE — 97140 MANUAL THERAPY 1/> REGIONS: CPT

## 2025-03-25 PROCEDURE — 97110 THERAPEUTIC EXERCISES: CPT

## 2025-03-25 NOTE — PROGRESS NOTES
"Daily Note     Today's date: 3/25/2025  Patient name: Neal Delgadillo  : 1945  MRN: [id number]  Referring provider: Miguel Angel Sheppard DO  Dx:   Encounter Diagnosis     ICD-10-CM    1. S/P TKR (total knee replacement), left  Z96.652                      Subjective: Pt reports left knee feeling good and compliant with HEP.  Pt's daughter reports increase fluid retention yesterday but better today after meds.        Objective: See treatment diary below      Assessment: Tolerated treatment well. Pt is 2 weeks post op.  Gait with RW and transfers improving.  Removed outer Tegaderm gauze dressing per MD instructions with mesh intact and dry.  No increase edema or TTP noted.  Good tolerance to progression of exercise with verbal/tactile cues provided.  Quad control improving.  Left knee flexion PROM to 95 degrees; full knee extension.  Pt able to complete full revolution on bike today.  Pt denied increase pain post treatment.   Patient would benefit from continued PT      Plan: Continue per plan of care.   Next follow up appt with MD 25.       Precautions: none      Manuals 3/13 3/17 3/21 3/25         PROM knee flexion/ext    GH GH                                                Neuro Re-Ed                                                                                                        Ther Ex             bike nv nv nv 5 min         Supine heel slide   10\"x 10 with manual A GH GH         Seated heel slide reviewed 10\"x  10 GH Seated knee flexion 10\" x10         Sit to stand nv Pt education x5  GH x10         Quad set/SAQ reviewed 5\"x10 with tc's  10\"x 10 with tc's GH         LAQ reviewed 5\"x10 10\"x 10 GH         Seated hamstring/gastroc stretch reviewed 10\"x 10 GH GH         SLR   X10 with tc's 5\"x10 with tc's                      Ther Activity             ambulation 5' RW  GH GH                      Gait Training                                       Modalities                                     "

## 2025-03-26 RX ORDER — KETOCONAZOLE 20 MG/G
CREAM TOPICAL
Qty: 15 G | Refills: 3 | OUTPATIENT
Start: 2025-03-26

## 2025-03-27 ENCOUNTER — OFFICE VISIT (OUTPATIENT)
Dept: PHYSICAL THERAPY | Facility: CLINIC | Age: 80
End: 2025-03-27
Payer: MEDICARE

## 2025-03-27 DIAGNOSIS — Z96.652 S/P TKR (TOTAL KNEE REPLACEMENT), LEFT: Primary | ICD-10-CM

## 2025-03-27 PROCEDURE — 97140 MANUAL THERAPY 1/> REGIONS: CPT

## 2025-03-27 PROCEDURE — 97110 THERAPEUTIC EXERCISES: CPT

## 2025-03-27 PROCEDURE — 97112 NEUROMUSCULAR REEDUCATION: CPT

## 2025-03-27 NOTE — PROGRESS NOTES
"Daily Note     Today's date: 3/27/2025  Patient name: Neal Delgadillo  : 1945  MRN: [id number]  Referring provider: Miguel Angel Sheppard DO  Dx:   Encounter Diagnosis     ICD-10-CM    1. S/P TKR (total knee replacement), left  Z96.652                      Subjective: Pt reports left knee feeling good and compliant with HEP.  Pt's daughter reports increase fluid retention yesterday but better today after meds.        Objective: See treatment diary below      Assessment: Tolerated treatment well.  Pt is 2 weeks/3 days post op.  Gait with RW and transfers improving.  Incision closed and dry with mesh.  No increase edema or TTP noted.  Good tolerance to progression of exercise with verbal/tactile cues provided.  Quad control improving.  Left knee flexion PROM to 100 degrees; full knee extension.  Minimal soreness reported post treatment.   Patient would benefit from continued PT      Plan: Continue per plan of care.   Next follow up appt with MD 25.       Precautions: none      Manuals 3/13 3/17 3/21 3/25 3/27        PROM knee flexion/ext    GH GH GH                                               Neuro Re-Ed                          Standing march at railing     5\"x10 each                                                                         Ther Ex             bike nv nv nv 5 min 5 min        Supine heel slide   10\"x 10 with manual A GH GH GH        Seated heel slide reviewed 10\"x  10 GH Seated knee flexion 10\" x10 GH        Sit to stand nv Pt education x5  GH x10 GH        Quad set/SAQ reviewed 5\"x10 with tc's  10\"x 10 with tc's GH GH        LAQ reviewed 5\"x10 10\"x 10 GH GH        Seated hamstring/gastroc stretch reviewed 10\"x 10 GH GH GH        SLR   X10 with tc's 5\"x10 with tc's GH                     Ther Activity             ambulation 5' RW  GH GH GH                     Gait Training                                       Modalities                                            "

## 2025-03-31 ENCOUNTER — OFFICE VISIT (OUTPATIENT)
Dept: PHYSICAL THERAPY | Facility: CLINIC | Age: 80
End: 2025-03-31
Payer: MEDICARE

## 2025-03-31 DIAGNOSIS — Z96.652 S/P TKR (TOTAL KNEE REPLACEMENT), LEFT: Primary | ICD-10-CM

## 2025-03-31 PROCEDURE — 97110 THERAPEUTIC EXERCISES: CPT | Performed by: PHYSICAL THERAPIST

## 2025-03-31 PROCEDURE — 97140 MANUAL THERAPY 1/> REGIONS: CPT | Performed by: PHYSICAL THERAPIST

## 2025-03-31 NOTE — PROGRESS NOTES
"Daily Note     Today's date: 3/31/2025  Patient name: Neal Delgadillo  : 1945  MRN: [id number]  Referring provider: Miguel Angel Sheppard DO  Dx:   Encounter Diagnosis     ICD-10-CM    1. S/P TKR (total knee replacement), left  Z96.652                      Subjective: Pt notes no new complaints.       Objective: See treatment diary below      Assessment: Tolerated treatment well. Patient would benefit from continued PT. Continue knee flexion ROM deficits but improving.       Plan: Continue per plan of care.      Precautions: none      Manuals 3/13 3/17 3/21 3/25 3/27 3/31       PROM knee flexion/ext    GH GH GH CB                                              Neuro Re-Ed                          Standing march at railing     5\"x10 each CB                                                                        Ther Ex             bike nv nv nv 5 min 5 min CB       Supine heel slide   10\"x 10 with manual A GH GH GH CB       Seated heel slide reviewed 10\"x  10 GH Seated knee flexion 10\" x10 GH CB       Sit to stand nv Pt education x5  GH x10 GH CB       Quad set/SAQ reviewed 5\"x10 with tc's  10\"x 10 with tc's GH GH CB       LAQ reviewed 5\"x10 10\"x 10 GH GH CB       Seated hamstring/gastroc stretch reviewed 10\"x 10 GH GH GH CB       SLR   X10 with tc's 5\"x10 with tc's GH CB                    Ther Activity             ambulation 5' RW  GH GH GH CB                    Gait Training                                       Modalities                                              "

## 2025-04-02 ENCOUNTER — OFFICE VISIT (OUTPATIENT)
Dept: PHYSICAL THERAPY | Facility: CLINIC | Age: 80
End: 2025-04-02
Payer: MEDICARE

## 2025-04-02 DIAGNOSIS — Z96.652 S/P TKR (TOTAL KNEE REPLACEMENT), LEFT: Primary | ICD-10-CM

## 2025-04-02 PROCEDURE — 97140 MANUAL THERAPY 1/> REGIONS: CPT | Performed by: PHYSICAL THERAPIST

## 2025-04-02 PROCEDURE — 97112 NEUROMUSCULAR REEDUCATION: CPT | Performed by: PHYSICAL THERAPIST

## 2025-04-02 PROCEDURE — 97110 THERAPEUTIC EXERCISES: CPT | Performed by: PHYSICAL THERAPIST

## 2025-04-02 NOTE — PROGRESS NOTES
"Daily Note     Today's date: 2025  Patient name: Neal Delgadillo  : 1945  MRN: [id number]  Referring provider: Miguel Angel Sheppard*  Dx:   Encounter Diagnosis     ICD-10-CM    1. S/P TKR (total knee replacement), left  Z96.652                      Subjective: Pt notes continued improvement in knee sxs.       Objective: See treatment diary below      Assessment: Tolerated treatment well. Patient would benefit from continued PT. 100* flexion PROM today. Educated on continued stretching for home.       Plan: Continue per plan of care.      Precautions: none      Manuals 3/13 3/17 3/21 3/25 3/27 3/31 4/2      PROM knee flexion/ext    GH GH GH CB CB                                             Neuro Re-Ed                          Standing march at railing     5\"x10 each CB CB                                                                       Ther Ex             bike nv nv nv 5 min 5 min CB 10'      Supine heel slide   10\"x 10 with manual A GH GH GH CB CB      Seated heel slide reviewed 10\"x  10 GH Seated knee flexion 10\" x10 GH CB CB      Sit to stand nv Pt education x5  GH x10 GH CB CB      Quad set/SAQ reviewed 5\"x10 with tc's  10\"x 10 with tc's GH GH CB CB      LAQ reviewed 5\"x10 10\"x 10 GH GH CB CB      Seated hamstring/gastroc stretch reviewed 10\"x 10 GH GH GH CB       SLR   X10 with tc's 5\"x10 with tc's GH CB CB      Knee flexion at step stretch       10x10:      Ther Activity             ambulation 5' RW  GH GH GH CB CB                   Gait Training                                       Modalities                                                "

## 2025-04-09 ENCOUNTER — APPOINTMENT (OUTPATIENT)
Dept: PHYSICAL THERAPY | Facility: CLINIC | Age: 80
End: 2025-04-09
Payer: MEDICARE

## 2025-04-10 ENCOUNTER — OFFICE VISIT (OUTPATIENT)
Dept: PHYSICAL THERAPY | Facility: CLINIC | Age: 80
End: 2025-04-10
Payer: MEDICARE

## 2025-04-10 DIAGNOSIS — Z96.652 S/P TKR (TOTAL KNEE REPLACEMENT), LEFT: Primary | ICD-10-CM

## 2025-04-10 PROCEDURE — 97110 THERAPEUTIC EXERCISES: CPT | Performed by: PHYSICAL THERAPIST

## 2025-04-10 PROCEDURE — 97112 NEUROMUSCULAR REEDUCATION: CPT | Performed by: PHYSICAL THERAPIST

## 2025-04-10 PROCEDURE — 97140 MANUAL THERAPY 1/> REGIONS: CPT | Performed by: PHYSICAL THERAPIST

## 2025-04-10 NOTE — PROGRESS NOTES
"Daily Note     Today's date: 4/10/2025  Patient name: Neal Delgadillo  : 1945  MRN: [id number]  Referring provider: Miguel Angel Sheppard*  Dx:   Encounter Diagnosis     ICD-10-CM    1. S/P TKR (total knee replacement), left  Z96.652                      Subjective: Pt reports steady improvement in symptoms. He had a follow-up with his surgeon who was satisfied with his progress.       Objective: See treatment diary below  Knee Flexion: 102 degrees      Assessment: Tolerated treatment well. PROM continues to steadily improve. Fatigue noted with all strengthening exercises today. Instructed the patient to perform STS without UE with fair tolerance. Continued educating the patient on continuing with stretches at home.       Plan: Continue per plan of care.      Precautions: none      Manuals 3/13 3/17 3/21 3/25 3/27 3/31 4/2 4/10     PROM knee flexion/ext    GH GH GH CB CB NB                                            Neuro Re-Ed                          Standing march at railing     5\"x10 each CB CB                                                                       Ther Ex             bike nv nv nv 5 min 5 min CB 10' 10'     Supine heel slide   10\"x 10 with manual A GH GH GH CB CB NB     Seated heel slide reviewed 10\"x  10 GH Seated knee flexion 10\" x10 GH CB CB NB     Sit to stand nv Pt education x5  GH x10 GH CB CB X12 no UE     Quad set/SAQ reviewed 5\"x10 with tc's  10\"x 10 with tc's GH GH CB CB NB     LAQ reviewed 5\"x10 10\"x 10 GH GH CB CB NB     Seated hamstring/gastroc stretch reviewed 10\"x 10 GH GH GH CB  NB     SLR   X10 with tc's 5\"x10 with tc's GH CB CB NB     Knee flexion at step stretch       10x10: 10x10:     Ther Activity             ambulation 5' RW  GH GH GH CB CB NB                  Gait Training                                       Modalities                                                "

## 2025-04-11 ENCOUNTER — OFFICE VISIT (OUTPATIENT)
Dept: PHYSICAL THERAPY | Facility: CLINIC | Age: 80
End: 2025-04-11
Payer: MEDICARE

## 2025-04-11 DIAGNOSIS — Z96.652 S/P TKR (TOTAL KNEE REPLACEMENT), LEFT: Primary | ICD-10-CM

## 2025-04-11 PROCEDURE — 97140 MANUAL THERAPY 1/> REGIONS: CPT | Performed by: PHYSICAL THERAPIST

## 2025-04-11 PROCEDURE — 97112 NEUROMUSCULAR REEDUCATION: CPT | Performed by: PHYSICAL THERAPIST

## 2025-04-11 PROCEDURE — 97110 THERAPEUTIC EXERCISES: CPT | Performed by: PHYSICAL THERAPIST

## 2025-04-11 NOTE — PROGRESS NOTES
"Daily Note     Today's date: 2025  Patient name: Neal Delgadillo  : 1945  MRN: [id number]  Referring provider: Miguel Angel Sheppard*  Dx:   Encounter Diagnosis     ICD-10-CM    1. S/P TKR (total knee replacement), left  Z96.652                      Subjective: Pt reports steady improvement in symptoms.       Objective: See treatment diary below  Knee Flexion: 102 degrees      Assessment: Tolerated treatment well.       Plan: Continue per plan of care.      Precautions: none      Manuals 3/13 3/17 3/21 3/25 3/27 3/31 4/2 4/10 4/11    PROM knee flexion/ext    GH GH GH CB CB NB ND                                           Neuro Re-Ed                          Standing march at railing     5\"x10 each CB CB                                                                       Ther Ex             bike nv nv nv 5 min 5 min CB 10' 10' ND    Supine heel slide   10\"x 10 with manual A GH GH GH CB CB NB ND    Seated heel slide reviewed 10\"x  10 GH Seated knee flexion 10\" x10 GH CB CB NB     Sit to stand nv Pt education x5  GH x10 GH CB CB X12 no UE ND    Quad set/SAQ reviewed 5\"x10 with tc's  10\"x 10 with tc's GH GH CB CB NB ND    LAQ reviewed 5\"x10 10\"x 10 GH GH CB CB NB ND    Seated hamstring/gastroc stretch reviewed 10\"x 10 GH GH GH CB  NB     SLR   X10 with tc's 5\"x10 with tc's GH CB CB NB     Knee flexion at step stretch       10x10: 10x10: ND    Ther Activity             ambulation 5' RW  GH GH GH CB CB NB                  Gait Training                                       Modalities                                                "

## 2025-04-15 ENCOUNTER — OFFICE VISIT (OUTPATIENT)
Dept: PHYSICAL THERAPY | Facility: CLINIC | Age: 80
End: 2025-04-15
Payer: MEDICARE

## 2025-04-15 DIAGNOSIS — Z96.652 S/P TKR (TOTAL KNEE REPLACEMENT), LEFT: Primary | ICD-10-CM

## 2025-04-15 PROCEDURE — 97110 THERAPEUTIC EXERCISES: CPT

## 2025-04-15 PROCEDURE — 97140 MANUAL THERAPY 1/> REGIONS: CPT

## 2025-04-15 PROCEDURE — 97530 THERAPEUTIC ACTIVITIES: CPT

## 2025-04-15 PROCEDURE — 97112 NEUROMUSCULAR REEDUCATION: CPT

## 2025-04-15 NOTE — PROGRESS NOTES
"Daily Note     Today's date: 4/15/2025  Patient name: Neal Delgadillo  : 1945  MRN: [id number]  Referring provider: Miguel Angel Sheppard*  Dx:   Encounter Diagnosis     ICD-10-CM    1. S/P TKR (total knee replacement), left  Z96.652                      Subjective: Pt reports continued improvement in left knee ROM and strength.  Pt presents today ambulating with small base quad cane.        Objective: See treatment diary below  Knee Flexion: 102 degrees      Assessment: Tolerated treatment well.  Cane adjusted for proper height and verbal cues provided for gait pattern.  Good tolerance to progression of functional mobility and strengthening exercise as noted.  Pt would benefit from continued PT and continue to progress as tolerated.        Plan: Continue per plan of care.      Precautions: none      Manuals 4/15            PROM knee flexion/ext  GH            Patellar mobs GH                                      Neuro Re-Ed                          Standing march at railing X10 each            Squats at railing  x10                                                                Ther Ex             bike 10'             Supine heel slide  10\"x 10                         Sit to stand             Quad set/SAQ             LAQ 3# 10\"x 10            Seated hamstring/gastroc stretch             SLR 5\"x10            Lunge stretch at step 10\"x5 each            Ther Activity             Step up 6 inch X10 L                         Gait Training             SBQC GH                         Modalities                                                "

## 2025-04-17 ENCOUNTER — OFFICE VISIT (OUTPATIENT)
Dept: PHYSICAL THERAPY | Facility: CLINIC | Age: 80
End: 2025-04-17
Payer: MEDICARE

## 2025-04-17 DIAGNOSIS — Z96.652 S/P TKR (TOTAL KNEE REPLACEMENT), LEFT: Primary | ICD-10-CM

## 2025-04-17 PROCEDURE — 97140 MANUAL THERAPY 1/> REGIONS: CPT

## 2025-04-17 PROCEDURE — 97112 NEUROMUSCULAR REEDUCATION: CPT

## 2025-04-17 PROCEDURE — 97110 THERAPEUTIC EXERCISES: CPT

## 2025-04-17 PROCEDURE — 97530 THERAPEUTIC ACTIVITIES: CPT

## 2025-04-17 NOTE — PROGRESS NOTES
"Daily Note     Today's date: 2025  Patient name: Neal Delgadillo  : 1945  MRN: [id number]  Referring provider: Miguel Angel Sheppard*  Dx:   Encounter Diagnosis     ICD-10-CM    1. S/P TKR (total knee replacement), left  Z96.652                      Subjective: Pt reports continued improvement in left knee ROM and strength.  Minimal soreness the day after last visit.        Objective: See treatment diary below  Knee Flexion: 102 degrees      Assessment: Tolerated treatment well.  Good tolerance to exercise with verbal/tactile cues provided for proper technique.  Pt progressing steadily with left LE functional mobility, strength and gait.  Pt would benefit from continued PT and continue to progress as tolerated.        Plan: Continue per plan of care.      Precautions: none      Manuals 4/15 4/17           PROM knee flexion/ext  GH GH           Patellar mobs GH GH                                     Neuro Re-Ed                          Standing march at railing X10 each            Chair squats at railing  x10 GH                                                               Ther Ex             bike 10'  10'           Supine heel slide  10\"x 10 GH                        Sit to stand             Quad set/SAQ             LAQ 3# 10\"x 10 GH           Seated hamstring/gastroc stretch             SLR 5\"x10 x15           Lunge stretch at step 10\"x5 each GH           Ther Activity             Step up 6 inch X10 L GH                        Gait Training             SBQC  GH                        Modalities                                                "

## 2025-04-21 ENCOUNTER — OFFICE VISIT (OUTPATIENT)
Dept: PHYSICAL THERAPY | Facility: CLINIC | Age: 80
End: 2025-04-21
Payer: MEDICARE

## 2025-04-21 DIAGNOSIS — Z96.652 S/P TKR (TOTAL KNEE REPLACEMENT), LEFT: Primary | ICD-10-CM

## 2025-04-21 PROCEDURE — 97112 NEUROMUSCULAR REEDUCATION: CPT

## 2025-04-21 PROCEDURE — 97140 MANUAL THERAPY 1/> REGIONS: CPT

## 2025-04-21 PROCEDURE — 97110 THERAPEUTIC EXERCISES: CPT

## 2025-04-21 NOTE — PROGRESS NOTES
"Daily Note     Today's date: 2025  Patient name: Neal Delgadillo  : 1945  MRN: [id number]  Referring provider: Miguel Angel Sheppard*  Dx:   Encounter Diagnosis     ICD-10-CM    1. S/P TKR (total knee replacement), left  Z96.652                      Subjective: Pt reports continued improvement in left knee ROM and strength.  Minimal pain left knee and ankle.       Objective: See treatment diary below  Knee Flexion: 102 degrees      Assessment: Tolerated treatment well.  Good tolerance to exercise with verbal/tactile cues provided for proper technique.  Pt progressing steadily with left LE functional mobility, strength and gait.  Pt would benefit from continued PT and continue to progress as tolerated.        Plan: Continue per plan of care.      Precautions: none      Manuals 4/15 4/17 4/21          PROM knee flexion/ext  GH GH GH          Patellar mobs GH GH GH                                    Neuro Re-Ed                          Standing march at railing X10 each GH GH          Chair squats at railing  x10 GH GH                                                              Ther Ex             bike 10'  10' 10'           Supine heel slide  10\"x 10 GH                        Sit to stand             Quad set/SAQ             LAQ 3# 10\"x 10 GH 4# 10\"x 10          Seated hamstring/gastroc stretch             SLR 5\"x10 x15           Lunge stretch at step 10\"x5 each GH GH          Ther Activity             Step up 6 inch X10 L GH GH                       Gait Training             SBQC GH GH                        Modalities                                                "

## 2025-04-23 ENCOUNTER — OFFICE VISIT (OUTPATIENT)
Dept: PHYSICAL THERAPY | Facility: CLINIC | Age: 80
End: 2025-04-23
Payer: MEDICARE

## 2025-04-23 DIAGNOSIS — Z96.652 S/P TKR (TOTAL KNEE REPLACEMENT), LEFT: Primary | ICD-10-CM

## 2025-04-23 PROCEDURE — 97140 MANUAL THERAPY 1/> REGIONS: CPT | Performed by: PHYSICAL THERAPIST

## 2025-04-23 PROCEDURE — 97110 THERAPEUTIC EXERCISES: CPT | Performed by: PHYSICAL THERAPIST

## 2025-04-23 PROCEDURE — 97112 NEUROMUSCULAR REEDUCATION: CPT | Performed by: PHYSICAL THERAPIST

## 2025-04-23 NOTE — PROGRESS NOTES
"Daily Note     Today's date: 2025  Patient name: Neal Delgadillo  : 1945  MRN: [id number]  Referring provider: Miguel Angel Sheppard*  Dx:   Encounter Diagnosis     ICD-10-CM    1. S/P TKR (total knee replacement), left  Z96.652                      Subjective: Pt notes no new complaints.       Objective: See treatment diary below      Assessment: Tolerated treatment well. Patient would benefit from continued PT. Pt with improving knee ROM overall.       Plan: Continue per plan of care.      Precautions: none      Manuals 4/15 4/17 4/21 4/23         PROM knee flexion/ext  GH GH GH CB         Patellar mobs GH GH GH CB                                   Neuro Re-Ed                          Standing march at railing X10 each GH GH CB         Chair squats at railing  x10 GH GH CB                                                             Ther Ex             bike 10'  10' 10'  10'         Supine heel slide  10\"x 10 GH                        Sit to stand             Quad set/SAQ             LAQ 3# 10\"x 10 GH 4# 10\"x 10 CB         Seated hamstring/gastroc stretch             SLR 5\"x10 x15           Lunge stretch at step 10\"x5 each GH GH CB         Ther Activity             Step up 6 inch X10 L GH GH CB                      Gait Training             SBQC GH GH                        Modalities                                                  "

## 2025-04-28 ENCOUNTER — OFFICE VISIT (OUTPATIENT)
Dept: PHYSICAL THERAPY | Facility: CLINIC | Age: 80
End: 2025-04-28
Payer: MEDICARE

## 2025-04-28 DIAGNOSIS — Z96.652 S/P TKR (TOTAL KNEE REPLACEMENT), LEFT: Primary | ICD-10-CM

## 2025-04-28 PROCEDURE — 97112 NEUROMUSCULAR REEDUCATION: CPT

## 2025-04-28 PROCEDURE — 97110 THERAPEUTIC EXERCISES: CPT

## 2025-04-28 PROCEDURE — 97140 MANUAL THERAPY 1/> REGIONS: CPT

## 2025-04-28 NOTE — PROGRESS NOTES
"Daily Note     Today's date: 2025  Patient name: Neal Delgadillo  : 1945  MRN: [id number]  Referring provider: Miguel Angel Sheppard*  Dx:   Encounter Diagnosis     ICD-10-CM    1. S/P TKR (total knee replacement), left  Z96.652                      Subjective: Pt not feeling well with upper respiratory symptoms the past week; pt's daughter contacted MD and awaiting return call.  No new c/o's left knee.       Objective: See treatment diary below      Assessment: Tolerated treatment well. Treatment limited today secondary to pt not feeling well. Patient would benefit from continued PT. Pt with improving knee ROM overall.       Plan: Continue per plan of care.      Precautions: none      Manuals 4/15 4/17 4/21 4/23 4/28        PROM knee flexion/ext  GH GH GH CB GH        Patellar mobs GH GH GH CB GH                                  Neuro Re-Ed                          Standing march at railing X10 each GH GH CB GH        Chair squats at railing  x10 GH GH CB GH                                                            Ther Ex             bike 10'  10' 10'  10' 5'        Supine heel slide  10\"x 10 GH                        Sit to stand             Quad set/SAQ             LAQ 3# 10\"x 10 GH 4# 10\"x 10 CB GH        Seated hamstring/gastroc stretch             SLR 5\"x10 x15           Lunge stretch at step 10\"x5 each GH GH CB         Ther Activity             Step up 6 inch X10 L GH GH CB                      Gait Training             SBQC GH GH                        Modalities                                                  "
Detail Level: Generalized
Detail Level: Detailed
Detail Level: Zone
Detail Level: Simple

## 2025-05-02 ENCOUNTER — OFFICE VISIT (OUTPATIENT)
Dept: PHYSICAL THERAPY | Facility: CLINIC | Age: 80
End: 2025-05-02
Payer: MEDICARE

## 2025-05-02 DIAGNOSIS — Z96.652 S/P TKR (TOTAL KNEE REPLACEMENT), LEFT: Primary | ICD-10-CM

## 2025-05-02 PROCEDURE — 97112 NEUROMUSCULAR REEDUCATION: CPT

## 2025-05-02 PROCEDURE — 97140 MANUAL THERAPY 1/> REGIONS: CPT

## 2025-05-02 PROCEDURE — 97110 THERAPEUTIC EXERCISES: CPT

## 2025-05-02 NOTE — PROGRESS NOTES
"Daily Note     Today's date: 2025  Patient name: Neal Delgadillo  : 1945  MRN: [id number]  Referring provider: Miguel Angel Sheppard*  Dx:   Encounter Diagnosis     ICD-10-CM    1. S/P TKR (total knee replacement), left  Z96.652                      Subjective: Pt continues to have upper respiratory symptoms; pt's daughter states he has appt for echo today and will be going to hospital this weekend secondary to increase fluid retention.  No new c/o's left knee.       Objective: See treatment diary below      Assessment: Tolerated treatment well.  Moderate edema b/l LE's.  Left knee ROM and strength improving.  Good tolerance to exercise as noted without increase symptoms.   Patient would benefit from continued PT.        Plan: Continue per plan of care.      Precautions: none      Manuals 4/15 4/17 4/21 4/23 4/28 5/2       PROM knee flexion/ext  GH GH GH CB GH GH       Patellar mobs GH GH GH CB GH GH                                 Neuro Re-Ed                          Standing march at railing X10 each GH GH CB GH GH       Chair squats at railing  x10 GH GH CB GH GH                                                           Ther Ex             Bike ROM 10'  10' 10'  10' 5' 5'        Supine heel slide  10\"x 10 GH                        Sit to stand             Quad set/SAQ             LAQ 3# 10\"x 10 GH 4# 10\"x 10 CB GH 5# 10\"x 10       Seated hamstring/gastroc stretch             SLR 5\"x10 x15           Lunge stretch at step 10\"x5 each GH GH CB         Ther Activity             Step up 6 inch X10 L GH GH CB                      Gait Training             SBQC GH GH                        Modalities                                                  "

## 2025-05-12 ENCOUNTER — OFFICE VISIT (OUTPATIENT)
Dept: PHYSICAL THERAPY | Facility: CLINIC | Age: 80
End: 2025-05-12
Payer: MEDICARE

## 2025-05-12 DIAGNOSIS — Z96.652 S/P TKR (TOTAL KNEE REPLACEMENT), LEFT: Primary | ICD-10-CM

## 2025-05-12 PROCEDURE — 97140 MANUAL THERAPY 1/> REGIONS: CPT

## 2025-05-12 PROCEDURE — 97110 THERAPEUTIC EXERCISES: CPT

## 2025-05-12 PROCEDURE — 97112 NEUROMUSCULAR REEDUCATION: CPT

## 2025-05-12 NOTE — PROGRESS NOTES
"Daily Note     Today's date: 2025  Patient name: Neal Delgadillo  : 1945  MRN: [id number]  Referring provider: Miguel Angel Sheppard*  Dx:   Encounter Diagnosis     ICD-10-CM    1. S/P TKR (total knee replacement), left  Z96.652                      Subjective: Pt recently hospitalized for fluid retention and cleared to resume PT.  Pt reports left knee feeling good with no new c/o's        Objective: See treatment diary below      Assessment: Tolerated treatment well.  Edema decreased b/l LE's.  Left knee ROM and strength improving.  Good tolerance to progression of strengthening exercise as noted.  Full knee extension ROM; knee flexion PROM to 108 degrees.  Patient would benefit from continued PT.        Plan: Continue per plan of care.   Pt has follow up appt with ortho tomorrow.       Precautions: none      Manuals 4/15 4/17 4/21 4/23 4/28 5/2 5/12      PROM knee flexion/ext  GH GH GH CB GH GH GH      Patellar mobs GH GH GH CB GH GH GH                                Neuro Re-Ed                          Standing march at railing X10 each GH GH CB GH GH X10 each      Chair squats at railing  x10 GH GH CB GH GH x10                                                          Ther Ex             Bike ROM 10'  10' 10'  10' 5' 5'  5'      Supine heel slide  10\"x 10 GH                        Sit to stand             Leg press DL       80# x15      LAQ 3# 10\"x 10 GH 4# 10\"x 10 CB GH 5# 10\"x 10 GH      Seated hamstring/gastroc stretch             SLR 5\"x10 x15           Lunge stretch at step 10\"x5 each GH GH CB         Ther Activity             6 inch step up/down X10 L GH GH CB   3 steps x2 (alternate) with railing                   Gait Training             SBQC GH GH                        Modalities                                                  "

## 2025-05-16 ENCOUNTER — OFFICE VISIT (OUTPATIENT)
Dept: PHYSICAL THERAPY | Facility: CLINIC | Age: 80
End: 2025-05-16
Payer: MEDICARE

## 2025-05-16 DIAGNOSIS — Z96.652 S/P TKR (TOTAL KNEE REPLACEMENT), LEFT: Primary | ICD-10-CM

## 2025-05-16 PROCEDURE — 97112 NEUROMUSCULAR REEDUCATION: CPT

## 2025-05-16 PROCEDURE — 97110 THERAPEUTIC EXERCISES: CPT

## 2025-05-16 PROCEDURE — 97140 MANUAL THERAPY 1/> REGIONS: CPT

## 2025-05-16 NOTE — PROGRESS NOTES
"Daily Note     Today's date: 2025  Patient name: Neal Delgadillo  : 1945  MRN: [id number]  Referring provider: Miguel Angel Sheppard*  Dx:   Encounter Diagnosis     ICD-10-CM    1. S/P TKR (total knee replacement), left  Z96.652                      Subjective: Pt had follow up with ortho  and returns with orders to continue PT.   Pt reports feeling good with no new c/o's.        Objective: See treatment diary below      Assessment: Tolerated treatment well.  Edema decreased b/l LE's.  Left knee ROM and strength continues to improve.   Good tolerance to progression of strengthening exercise as noted.  Pt would benefit from continued PT and progress as tolerated.      Plan: Continue per plan of care.         Precautions: none      Manuals 4/15 4/17 4/21 4/23 4/28 5/2 5/12 5/16     PROM knee flexion/ext  GH GH GH CB GH GH GH GH     Patellar mobs GH GH GH CB GH GH GH GH                               Neuro Re-Ed                          Standing march at railing X10 each GH GH CB GH GH X10 each X10 each     Chair squats at railing  x10 GH GH CB GH GH x10 x10                                                         Ther Ex             Bike ROM 10'  10' 10'  10' 5' 5'  5' 6'     Supine heel slide  10\"x 10 GH                        Sit to stand             Leg press DL       80# x15 95# x20     LAQ 3# 10\"x 10 GH 4# 10\"x 10 CB GH 5# 10\"x 10 GH 5# 10\"x 10     Seated hamstring/gastroc stretch             SLR 5\"x10 x15           Lunge stretch at step 10\"x5 each GH GH CB         Ther Activity             6 inch step up/down X10 L GH GH CB   3 steps x2 (alternate) with railing 3 steps x3 (alternate with railing)                  Gait Training             SBQC GH GH                        Modalities                                                  "

## 2025-05-19 ENCOUNTER — OFFICE VISIT (OUTPATIENT)
Dept: PHYSICAL THERAPY | Facility: CLINIC | Age: 80
End: 2025-05-19
Payer: MEDICARE

## 2025-05-19 DIAGNOSIS — Z96.652 S/P TKR (TOTAL KNEE REPLACEMENT), LEFT: Primary | ICD-10-CM

## 2025-05-19 PROCEDURE — 97140 MANUAL THERAPY 1/> REGIONS: CPT

## 2025-05-19 PROCEDURE — 97112 NEUROMUSCULAR REEDUCATION: CPT

## 2025-05-19 PROCEDURE — 97110 THERAPEUTIC EXERCISES: CPT

## 2025-05-19 NOTE — PROGRESS NOTES
"Daily Note     Today's date: 2025  Patient name: Neal Delgadillo  : 1945  MRN: [id number]  Referring provider: Miguel Angel Sheppard*  Dx:   Encounter Diagnosis     ICD-10-CM    1. S/P TKR (total knee replacement), left  Z96.652                      Subjective: Patient noted no new complaints.       Objective: See treatment diary below      Assessment: Tolerated treatment well. Patient exhibited good technique with therapeutic exercises and would benefit from continued PT      Plan: Continue per plan of care.      Precautions: none      Manuals 4/15 4/17 4/21 4/23 4/28 5/2 5/12 5/16 5/19    PROM knee flexion/ext  GH GH GH CB GH GH GH GH AC    Patellar mobs GH GH GH CB GH GH GH GH AC                              Neuro Re-Ed                          Standing march at railing X10 each GH GH CB GH GH X10 each X10 each 2 x 10 ea    Chair squats at railing  x10 GH GH CB GH GH x10 x10 2 x 10                                                        Ther Ex             Bike ROM 10'  10' 10'  10' 5' 5'  5' 6' 6'    Supine heel slide  10\"x 10 GH                        Sit to stand             Leg press DL       80# x15 95# x20 95# x 20    LAQ 3# 10\"x 10 GH 4# 10\"x 10 CB GH 5# 10\"x 10 GH 5# 10\"x 10 5# 10\" x 10    Seated hamstring/gastroc stretch             SLR 5\"x10 x15           Lunge stretch at step 10\"x5 each GH GH CB         Ther Activity             6 inch step up/down X10 L GH GH CB   3 steps x2 (alternate) with railing 3 steps x3 (alternate with railing) 3 steps x3 (alternate with railing)                 Gait Training             SBQC GH GH                        Modalities                                                    "

## 2025-05-23 ENCOUNTER — OFFICE VISIT (OUTPATIENT)
Dept: PHYSICAL THERAPY | Facility: CLINIC | Age: 80
End: 2025-05-23
Payer: MEDICARE

## 2025-05-23 DIAGNOSIS — Z96.652 S/P TKR (TOTAL KNEE REPLACEMENT), LEFT: Primary | ICD-10-CM

## 2025-05-23 PROCEDURE — 97110 THERAPEUTIC EXERCISES: CPT

## 2025-05-23 PROCEDURE — 97112 NEUROMUSCULAR REEDUCATION: CPT

## 2025-05-23 PROCEDURE — 97140 MANUAL THERAPY 1/> REGIONS: CPT

## 2025-06-02 ENCOUNTER — OFFICE VISIT (OUTPATIENT)
Dept: PHYSICAL THERAPY | Facility: CLINIC | Age: 80
End: 2025-06-02
Payer: MEDICARE

## 2025-06-02 DIAGNOSIS — Z96.652 S/P TKR (TOTAL KNEE REPLACEMENT), LEFT: Primary | ICD-10-CM

## 2025-06-02 PROCEDURE — 97110 THERAPEUTIC EXERCISES: CPT

## 2025-06-02 PROCEDURE — 97140 MANUAL THERAPY 1/> REGIONS: CPT

## 2025-06-02 PROCEDURE — 97112 NEUROMUSCULAR REEDUCATION: CPT

## 2025-06-02 NOTE — PROGRESS NOTES
"Daily Note     Today's date: 2025  Patient name: Neal Delgadillo  : 1945  MRN: [id number]  Referring provider: Miguel Angel Sheppard*  Dx:   Encounter Diagnosis     ICD-10-CM    1. S/P TKR (total knee replacement), left  Z96.652                      Subjective: Patient reports left knee feels good and no new c/o's.        Objective: See treatment diary below      Assessment: Tolerated treatment well.  Progressing steadily with left knee ROM and strength. Verbal/tactile cues provided with exercise for proper technique.  Pt demonstrates good tolerance to gait without assistive device in clinic with no instability or LOB noted.  Patient demonstrated fatigue post treatment, exhibited good technique with therapeutic exercises, and would benefit from continued PT.        Plan: Continue per plan of care.  Progress treatment as tolerated.       Precautions: none      Manuals 6/2            PROM knee flexion/ext  GH            STM/Patellar mobs GH                                      Neuro Re-Ed                          Standing march at railing             Chair squats at railing  x15                                                                Ther Ex             Bike ROM 6'             Supine heel slide                           Sit to stand             Leg press DL 95# x30            LAQ 5# 10\"x 20                         SLR             Lunge stretch at step             Ther Activity             6 inch step up/down 3 steps x4 (alternate with railing)                         Gait Training             Without assistive device in clinic  GH                         Modalities                                                      "

## 2025-06-05 ENCOUNTER — OFFICE VISIT (OUTPATIENT)
Dept: PHYSICAL THERAPY | Facility: CLINIC | Age: 80
End: 2025-06-05
Payer: MEDICARE

## 2025-06-05 DIAGNOSIS — Z96.652 S/P TKR (TOTAL KNEE REPLACEMENT), LEFT: Primary | ICD-10-CM

## 2025-06-05 PROCEDURE — 97164 PT RE-EVAL EST PLAN CARE: CPT | Performed by: PHYSICAL THERAPIST

## 2025-06-05 PROCEDURE — 97140 MANUAL THERAPY 1/> REGIONS: CPT | Performed by: PHYSICAL THERAPIST

## 2025-06-05 PROCEDURE — 97112 NEUROMUSCULAR REEDUCATION: CPT | Performed by: PHYSICAL THERAPIST

## 2025-06-05 PROCEDURE — 97110 THERAPEUTIC EXERCISES: CPT | Performed by: PHYSICAL THERAPIST

## 2025-06-05 NOTE — PROGRESS NOTES
"Daily Note     Today's date: 2025  Patient name: Neal Delgadillo  : 1945  MRN: [id number]  Referring provider: Miguel Angel Sheppard*  Dx:   Encounter Diagnosis     ICD-10-CM    1. S/P TKR (total knee replacement), left  Z96.652           Start Time: 1029  Stop Time: 1109  Total time in clinic (min): 40 minutes    Subjective: Pt reports no new complaints.       Objective: See treatment diary below      Assessment: Tolerated treatment well. Patient would benefit from continued PT. Educated to reduce use of railing during exercises in clinic to gain greater confidence with balance. Pt is progressing well overall.       Plan: Continue per plan of care.      Precautions: none      Manuals            PROM knee flexion/ext  GH CB           STM/Patellar mobs GH CB                                     Neuro Re-Ed                          Standing march at railing  X10 ea            Chair squats at railing  x15 x20           Side steps at railing  30ft x4                                                  Ther Ex             Bike ROM 6'  8'           Supine heel slide                           Sit to stand             Leg press DL 95# x30 CB           LAQ 5# 10\"x 20 CB                        SLR             Lunge stretch at step             Ther Activity             6 inch step up/down 3 steps x4 (alternate with railing) 3 steps x5 alternate                        Gait Training             Without assistive device in clinic  GH                         Modalities                                                        "

## 2025-06-09 ENCOUNTER — OFFICE VISIT (OUTPATIENT)
Dept: PHYSICAL THERAPY | Facility: CLINIC | Age: 80
End: 2025-06-09
Payer: MEDICARE

## 2025-06-09 DIAGNOSIS — Z96.652 S/P TKR (TOTAL KNEE REPLACEMENT), LEFT: Primary | ICD-10-CM

## 2025-06-09 PROCEDURE — 97140 MANUAL THERAPY 1/> REGIONS: CPT

## 2025-06-09 PROCEDURE — 97110 THERAPEUTIC EXERCISES: CPT

## 2025-06-09 PROCEDURE — 97112 NEUROMUSCULAR REEDUCATION: CPT

## 2025-06-09 NOTE — PROGRESS NOTES
"Daily Note     Today's date: 2025  Patient name: Neal Delgadillo  : 1945  MRN: [id number]  Referring provider: Miguel Angel Sheppard*  Dx:   Encounter Diagnosis     ICD-10-CM    1. S/P TKR (total knee replacement), left  Z96.652                      Subjective: Pt reports no new complaints.       Objective: See treatment diary below      Assessment: Tolerated treatment well.  Pt progressing steadily with left LE functional mobility and strength. Decrease UE assist required with gait/balance activity today.  Patient would benefit from continued PT.        Plan: Continue per plan of care.      Precautions: none      Manuals           PROM knee flexion/ext  GH CB GH          STM/Patellar mobs GH CB GH                                    Neuro Re-Ed             Exaggerate gait with march at railing   20 ft x4          Standing march at railing  X10 ea            Chair squats at railing  x15 x20 GH          Side steps at railing  30ft x4 20 ft x4                                                 Ther Ex             Bike ROM 6'  8' 8'          Supine heel slide                           Sit to stand             Leg press DL 95# x30 # x20          LAQ 5# 10\"x 20 CB GH                       SLR             Lunge stretch at step             Ther Activity             6 inch step up/down 3 steps x4 (alternate with railing) 3 steps x5 alternate GH                       Gait Training             Without assistive device in clinic  GH  GH                       Modalities                                                        "

## 2025-06-12 ENCOUNTER — OFFICE VISIT (OUTPATIENT)
Dept: PHYSICAL THERAPY | Facility: CLINIC | Age: 80
End: 2025-06-12
Payer: MEDICARE

## 2025-06-12 DIAGNOSIS — Z96.652 S/P TKR (TOTAL KNEE REPLACEMENT), LEFT: Primary | ICD-10-CM

## 2025-06-12 PROCEDURE — 97112 NEUROMUSCULAR REEDUCATION: CPT | Performed by: PHYSICAL THERAPIST

## 2025-06-12 PROCEDURE — 97110 THERAPEUTIC EXERCISES: CPT | Performed by: PHYSICAL THERAPIST

## 2025-06-12 PROCEDURE — 97140 MANUAL THERAPY 1/> REGIONS: CPT | Performed by: PHYSICAL THERAPIST

## 2025-06-12 NOTE — PROGRESS NOTES
"Daily Note     Today's date: 2025  Patient name: Neal Delgadillo  : 1945  MRN: [id number]  Referring provider: Miguel Angel Sheppard*  Dx:   Encounter Diagnosis     ICD-10-CM    1. S/P TKR (total knee replacement), left  Z96.652                      Subjective: Pt notes continued improvement in knee pain with no new complaints.       Objective: See treatment diary below      Assessment: Tolerated treatment well. Patient would benefit from continued PT. Pt continues to improve with knee ROM and strength.       Plan: Continue per plan of care.      Precautions: none      Manuals          PROM knee flexion/ext  GH CB GH CB         STM/Patellar mobs GH CB GH CB                                   Neuro Re-Ed             Exaggerate gait with march at railing   20 ft x4 CB         Standing march at railing  X10 ea            Chair squats at railing  x15 x20 GH CB         Side steps at railing  30ft x4 20 ft x4 CB                                                Ther Ex             Bike ROM 6'  8' 8' 10'         Supine heel slide                           Sit to stand             Leg press DL 95# x30 # x20 110# x30         LAQ 5# 10\"x 20 CB GH CB                      SLR             Lunge stretch at step             Ther Activity             6 inch step up/down 3 steps x4 (alternate with railing) 3 steps x5 alternate GH CB                      Gait Training             Without assistive device in clinic  GH  GH CB                      Modalities                                                          "

## 2025-06-16 ENCOUNTER — OFFICE VISIT (OUTPATIENT)
Dept: PHYSICAL THERAPY | Facility: CLINIC | Age: 80
End: 2025-06-16
Payer: MEDICARE

## 2025-06-16 DIAGNOSIS — Z96.652 S/P TKR (TOTAL KNEE REPLACEMENT), LEFT: Primary | ICD-10-CM

## 2025-06-16 PROCEDURE — 97112 NEUROMUSCULAR REEDUCATION: CPT

## 2025-06-16 PROCEDURE — 97140 MANUAL THERAPY 1/> REGIONS: CPT

## 2025-06-16 PROCEDURE — 97110 THERAPEUTIC EXERCISES: CPT

## 2025-06-16 NOTE — PROGRESS NOTES
"Daily Note     Today's date: 2025  Patient name: Neal Delgadillo  : 1945  MRN: [id number]  Referring provider: Miguel Angel Sheppard*  Dx:   Encounter Diagnosis     ICD-10-CM    1. S/P TKR (total knee replacement), left  Z96.652                      Subjective: Pt notes continued improvement in knee pain with no new complaints.       Objective: See treatment diary below      Assessment: Tolerated treatment well.  Pt c/o pain/cramping in posterior LE with bridges; decreased with seated hamstring stretch.  Patient would benefit from continued PT. Pt continues to improve with knee ROM and strength.       Plan: Continue per plan of care.      Precautions: none      Manuals         PROM knee flexion/ext  GH CB GH CB GH        STM/Patellar mobs GH CB GH CB GH                                  Neuro Re-Ed             Exaggerate gait with march at railing   20 ft x4 CB GH        Standing march at railing  X10 ea            Chair squats at railing  x15 x20 GH CB nv        Side steps at railing  30ft x4 20 ft x4 CB GH                                               Ther Ex             Bike ROM 6'  8' 8' 10' 10'         Supine heel slide                           Sit to stand             Leg press DL 95# x30 # x20 110# x30 GH        LAQ 5# 10\"x 20 CB GH CB GH        Bridge    5\"x10 GH        Seated HS stretch with strap     10\"x 10        SLR             Lunge stretch at step             Ther Activity             6 inch step up/down 3 steps x4 (alternate with railing) 3 steps x5 alternate GH CB nv                     Gait Training             Without assistive device in clinic  GH  GH CB GH                     Modalities                                                          "

## 2025-06-19 ENCOUNTER — OFFICE VISIT (OUTPATIENT)
Dept: PHYSICAL THERAPY | Facility: CLINIC | Age: 80
End: 2025-06-19
Payer: MEDICARE

## 2025-06-19 DIAGNOSIS — Z96.652 S/P TKR (TOTAL KNEE REPLACEMENT), LEFT: Primary | ICD-10-CM

## 2025-06-19 PROCEDURE — 97112 NEUROMUSCULAR REEDUCATION: CPT | Performed by: PHYSICAL THERAPIST

## 2025-06-19 PROCEDURE — 97110 THERAPEUTIC EXERCISES: CPT | Performed by: PHYSICAL THERAPIST

## 2025-06-19 PROCEDURE — 97140 MANUAL THERAPY 1/> REGIONS: CPT | Performed by: PHYSICAL THERAPIST

## 2025-06-19 NOTE — PROGRESS NOTES
"Daily Note     Today's date: 2025  Patient name: Neal Delgadillo  : 1945  MRN: [id number]  Referring provider: Miguel Angel Sheppard*  Dx:   Encounter Diagnosis     ICD-10-CM    1. S/P TKR (total knee replacement), left  Z96.652                      Subjective: Pt notes no new complaints.       Objective: See treatment diary below      Assessment: Tolerated treatment well. Patient would benefit from continued PT      Plan: Continue per plan of care.      Precautions: none      Manuals        PROM knee flexion/ext  GH CB GH CB GH CB       STM/Patellar mobs GH CB GH CB GH CB                                 Neuro Re-Ed             Exaggerate gait with march at railing   20 ft x4 CB GH CB       Standing march at railing  X10 ea            Chair squats at railing  x15 x20 GH CB nv x20       Side steps at railing  30ft x4 20 ft x4 CB GH CB                                              Ther Ex             Bike ROM 6'  8' 8' 10' 10'  10'       Supine heel slide                           Sit to stand             Leg press DL 95# x30 # x20 110# x30 GH CB       LAQ 5# 10\"x 20 CB GH CB GH 7.5# 2x10       Bridge    5\"x10 GH held       Seated HS stretch with strap     10\"x 10 10x10:        SLR             Lunge stretch at step             Ther Activity             6 inch step up/down 3 steps x4 (alternate with railing) 3 steps x5 alternate GH CB nv                     Gait Training             Without assistive device in clinic  GH  GH CB GH                     Modalities                                                            "

## 2025-06-23 ENCOUNTER — APPOINTMENT (OUTPATIENT)
Dept: PHYSICAL THERAPY | Facility: CLINIC | Age: 80
End: 2025-06-23
Payer: MEDICARE

## 2025-06-24 ENCOUNTER — OFFICE VISIT (OUTPATIENT)
Dept: PHYSICAL THERAPY | Facility: CLINIC | Age: 80
End: 2025-06-24
Payer: MEDICARE

## 2025-06-24 DIAGNOSIS — Z96.652 S/P TKR (TOTAL KNEE REPLACEMENT), LEFT: Primary | ICD-10-CM

## 2025-06-24 PROCEDURE — 97140 MANUAL THERAPY 1/> REGIONS: CPT

## 2025-06-24 PROCEDURE — 97110 THERAPEUTIC EXERCISES: CPT

## 2025-06-24 PROCEDURE — 97112 NEUROMUSCULAR REEDUCATION: CPT

## 2025-06-24 NOTE — PROGRESS NOTES
"Daily Note     Today's date: 2025  Patient name: Neal Delgadillo  : 1945  MRN: [id number]  Referring provider: Miguel Angel Sheppard*  Dx:   Encounter Diagnosis     ICD-10-CM    1. S/P TKR (total knee replacement), left  Z96.652                      Subjective: Pt was seen at [facility] yesterday and started antibiotics for cellulitis.         Objective: See treatment diary below      Assessment: Tolerated treatment well.  Redness, swelling, warmth and TTP left lower leg consistent with cellulitis. Good tolerance to manual therapy and modified exercise as noted without increase pain.     Plan: Continue per plan of care.      Precautions: none      Manuals       PROM knee flexion/ext  GH CB GH CB GH CB GH      STM/Patellar mobs GH CB GH CB GH CB GH                                Neuro Re-Ed             Exaggerate gait with march at railing   20 ft x4 CB GH CB GH      Standing march at railing  X10 ea            Chair squats at railing  x15 x20 GH CB nv x20       Side steps at railing  30ft x4 20 ft x4 CB GH CB GH                                             Ther Ex             Bike ROM 6'  8' 8' 10' 10'  10' 5'      Supine heel slide                           Sit to stand             Leg press DL 95# x30 # x20 110# x30 GH CB 95# x20      LAQ 5# 10\"x 20 CB GH CB GH 7.5# 2x10       Bridge    5\"x10 GH held       Seated HS stretch with strap     10\"x 10 10x10:        SLR             Lunge stretch at step             Ther Activity             6 inch step up/down 3 steps x4 (alternate with railing) 3 steps x5 alternate GH CB nv                     Gait Training             Without assistive device in clinic  GH  GH CB GH                     Modalities                                                            "

## 2025-06-26 ENCOUNTER — APPOINTMENT (OUTPATIENT)
Dept: PHYSICAL THERAPY | Facility: CLINIC | Age: 80
End: 2025-06-26
Payer: MEDICARE

## 2025-06-27 ENCOUNTER — OFFICE VISIT (OUTPATIENT)
Dept: PHYSICAL THERAPY | Facility: CLINIC | Age: 80
End: 2025-06-27
Payer: MEDICARE

## 2025-06-27 DIAGNOSIS — Z96.652 S/P TKR (TOTAL KNEE REPLACEMENT), LEFT: Primary | ICD-10-CM

## 2025-06-27 PROCEDURE — 97110 THERAPEUTIC EXERCISES: CPT

## 2025-06-27 PROCEDURE — 97112 NEUROMUSCULAR REEDUCATION: CPT

## 2025-06-27 PROCEDURE — 97140 MANUAL THERAPY 1/> REGIONS: CPT

## 2025-06-27 NOTE — PROGRESS NOTES
"Daily Note     Today's date: 2025  Patient name: Neal Delgadillo  : 1945  MRN: [id number]  Referring provider: Miguel Angel Sheppard*  Dx:   Encounter Diagnosis     ICD-10-CM    1. S/P TKR (total knee replacement), left  Z96.652                      Subjective: Pt continues with antibiotics for left LE cellulitis.   Pt states slowly improving.        Objective: See treatment diary below      Assessment: Tolerated treatment well.  Continued redness and TTP left lower leg consistent with cellulitis but slowly decreasing.  Good tolerance to manual therapy and exercise as noted without increase pain. Progressing slowly with left LE mobility and strength. Pt would benefit from continued PT.      Plan: Continue per plan of care.      Precautions: none      Manuals      PROM knee flexion/ext  GH CB GH CB GH CB GH GH     STM/Patellar mobs GH CB GH CB GH CB GH GH                               Neuro Re-Ed             Exaggerate gait with march at railing   20 ft x4 CB GH CB GH GH     Standing march at railing  X10 ea            Chair squats at railing  x15 x20 GH CB nv x20  x15     Side steps at railing  30ft x4 20 ft x4 CB GH CB GH GH                                            Ther Ex             Bike ROM 6'  8' 8' 10' 10'  10' 5' 6'     Supine heel slide                           Sit to stand             Leg press DL 95# x30 # x20 110# x30 GH CB 95# x20 110#x  20     LAQ 5# 10\"x 20 CB GH CB GH 7.5# 2x10       Bridge    5\"x10 GH held       Seated HS stretch with strap     10\"x 10 10x10:        SLR        x15     Lunge stretch at step             Ther Activity             6 inch step up/down 3 steps x4 (alternate with railing) 3 steps x5 alternate GH CB nv                     Gait Training             Without assistive device in clinic  GH  GH CB GH                     Modalities                                                            "

## 2025-06-30 ENCOUNTER — OFFICE VISIT (OUTPATIENT)
Dept: PHYSICAL THERAPY | Facility: CLINIC | Age: 80
End: 2025-06-30
Payer: MEDICARE

## 2025-06-30 DIAGNOSIS — Z96.652 S/P TKR (TOTAL KNEE REPLACEMENT), LEFT: Primary | ICD-10-CM

## 2025-06-30 PROCEDURE — 97112 NEUROMUSCULAR REEDUCATION: CPT

## 2025-06-30 PROCEDURE — 97110 THERAPEUTIC EXERCISES: CPT

## 2025-06-30 PROCEDURE — 97140 MANUAL THERAPY 1/> REGIONS: CPT

## 2025-06-30 NOTE — PROGRESS NOTES
"Daily Note     Today's date: 2025  Patient name: Neal Delgadillo  : 1945  MRN: [id number]  Referring provider: Miguel Angel Sheppard*  Dx:   Encounter Diagnosis     ICD-10-CM    1. S/P TKR (total knee replacement), left  Z96.652                      Subjective: Pt will be finishing antibiotics for left LE cellulitis today.   Pt states slowly improving.  Pt reports walking more at home.        Objective: See treatment diary below      Assessment: Tolerated treatment well.  Redness, swelling and TTP left lower leg slowly decreasing.   Good tolerance to manual therapy and exercise as noted without increase pain. Progressing slowly with left LE mobility and strength. Pt would benefit from continued PT.      Plan: Continue per plan of care.   Pt has cardiologist appt 25.       Precautions: none      Manuals     PROM knee flexion/ext  GH CB GH CB GH CB GH GH GH    STM/Patellar mobs GH CB GH CB GH CB GH GH GH                              Neuro Re-Ed             Exaggerate gait with march at railing   20 ft x4 CB GH CB GH GH     Standing march at railing  X10 ea            Chair squats at railing  x15 x20 GH CB nv x20  x15 GH    Side steps at railing  30ft x4 20 ft x4 CB GH CB GH GH GH                                           Ther Ex             Bike ROM 6'  8' 8' 10' 10'  10' 5' 6' 8'    Supine heel slide                           Sit to stand             Leg press DL 95# x30 # x20 110# x30 GH CB 95# x20 110#x  20 110#  x30    LAQ 5# 10\"x 20 CB GH CB GH 7.5# 2x10       Bridge    5\"x10 GH held       Seated HS stretch with strap     10\"x 10 10x10:        SLR        x15     Lunge stretch at step             Ther Activity             6 inch step up/down 3 steps x4 (alternate with railing) 3 steps x5 alternate GH CB nv    3 steps x5 alternating with railing)                 Gait Training             Without assistive device in clinic  GH  GH CB GH          "            Modalities

## 2025-07-03 ENCOUNTER — OFFICE VISIT (OUTPATIENT)
Dept: PHYSICAL THERAPY | Facility: CLINIC | Age: 80
End: 2025-07-03
Payer: MEDICARE

## 2025-07-03 DIAGNOSIS — Z96.652 S/P TKR (TOTAL KNEE REPLACEMENT), LEFT: Primary | ICD-10-CM

## 2025-07-03 PROCEDURE — 97140 MANUAL THERAPY 1/> REGIONS: CPT

## 2025-07-03 PROCEDURE — 97112 NEUROMUSCULAR REEDUCATION: CPT

## 2025-07-03 PROCEDURE — 97110 THERAPEUTIC EXERCISES: CPT

## 2025-07-03 NOTE — PROGRESS NOTES
"Daily Note     Today's date: 7/3/2025  Patient name: Neal Delgadillo  : 1945  MRN: [id number]  Referring provider: Miguel Angel Sheppard*  Dx:   Encounter Diagnosis     ICD-10-CM    1. S/P TKR (total knee replacement), left  Z96.652                      Subjective:  Pt continues to report slow improvement overall.         Objective: See treatment diary below      Assessment: Tolerated treatment well.  Redness, swelling and TTP left distal lower leg continues to slowly decrease.  Good tolerance to manual therapy and exercise as noted without increase pain. Progressing slowly with left LE mobility and strength. Gait and stability improving.  Able to ambulate in clinic without SPC and no LOB noted. Pt would benefit from continued PT.      Plan: Continue per plan of care.   Pt has cardiologist appt 25.       Precautions: none      Manuals 7/3 6/5 6/9 6/12 6/16 6/19 6/24 6/27 6/30    PROM knee flexion/ext  GH CB GH CB GH CB GH GH GH    STM/Patellar mobs GH CB GH CB GH CB GH GH GH                              Neuro Re-Ed             Exaggerate gait with march at railing   20 ft x4 CB GH CB GH GH     Standing march at railing 5\"x10 each X10 ea            Chair squats at railing  x15 x20 GH CB nv x20  x15 GH    Side steps at railing 20 ft x4 no  UE assist 30ft x4 20 ft x4 CB GH CB GH GH GH                                           Ther Ex             Bike ROM 8' 8' 8' 10' 10'  10' 5' 6' 8'    Supine heel slide                           Sit to stand             Leg press # x30 # x20 110# x30 GH CB 95# x20 110#x  20 110#  x30    LAQ  CB GH CB GH 7.5# 2x10       Bridge    5\"x10 GH held       Seated HS stretch with strap     10\"x 10 10x10:        SLR        x15     Lunge stretch at step             Ther Activity             6 inch step up/down 3 steps x5(alternate with railing) 3 steps x5 alternate GH CB nv    3 steps x5 alternating with railing)                 Gait Training             Without " assistive device in clinic  GH  GH CB GH                     Modalities

## 2025-07-10 ENCOUNTER — OFFICE VISIT (OUTPATIENT)
Dept: PHYSICAL THERAPY | Facility: CLINIC | Age: 80
End: 2025-07-10
Payer: MEDICARE

## 2025-07-10 DIAGNOSIS — Z96.652 S/P TKR (TOTAL KNEE REPLACEMENT), LEFT: Primary | ICD-10-CM

## 2025-07-10 PROCEDURE — 97112 NEUROMUSCULAR REEDUCATION: CPT

## 2025-07-10 PROCEDURE — 97110 THERAPEUTIC EXERCISES: CPT

## 2025-07-10 PROCEDURE — 97140 MANUAL THERAPY 1/> REGIONS: CPT

## 2025-07-10 NOTE — PROGRESS NOTES
"Daily Note     Today's date: 7/10/2025  Patient name: Neal Delgadillo  : 1945  MRN: [id number]  Referring provider: Miguel Angel Sheppard*  Dx:   Encounter Diagnosis     ICD-10-CM    1. S/P TKR (total knee replacement), left  Z96.652                      Subjective:  Pt continues to report slow improvement overall.   Minimal c/o pain and redness yet distal left LE from cellulitis.  No c/o's left knee.       Objective: See treatment diary below      Assessment: Tolerated treatment well.  Continued redness left distal lower leg.  Good tolerance to manual therapy and exercise as noted without increase pain. Progressing slowly with left LE functional mobility and strength. Gait and stability improving.  Pt reports feeling good post treatment. Pt would benefit from continued PT.      Plan: Continue per plan of care.        Precautions: none      Manuals 7/3 7/10 6/9 6/12 6/16 6/19 6/24 6/27 6/30    PROM knee flexion/ext  GH GH GH CB GH CB GH GH GH    STM/Patellar mobs GH GH GH CB GH CB GH GH GH                              Neuro Re-Ed             Exaggerate gait with march at railing  20 ft x4 20 ft x4 CB GH CB GH GH     Standing march at railing 5\"x10 each            Chair squats at railing  x15 x20 GH CB nv x20  x15 GH    Side steps at railing 20 ft x4 no  UE assist 20 ft x4 no UE assist 20 ft x4 CB GH CB GH GH GH                                           Ther Ex             Bike ROM 8' 8' 8' 10' 10'  10' 5' 6' 8'    Supine heel slide                           Sit to stand             Leg press # x30 # x20 110# x30 GH CB 95# x20 110#x  20 110#  x30    LAQ   GH CB GH 7.5# 2x10       Bridge    5\"x10 GH held       Seated HS stretch with strap     10\"x 10 10x10:        SLR        x15     Lunge stretch at step             Ther Activity             6 inch step up/down 3 steps x5(alternate with railing) GH GH CB nv    3 steps x5 alternating with railing)                 Gait Training           "   Without assistive device in clinic  GH GH GH CB GH                     Modalities

## 2025-07-11 ENCOUNTER — OFFICE VISIT (OUTPATIENT)
Dept: PHYSICAL THERAPY | Facility: CLINIC | Age: 80
End: 2025-07-11
Payer: MEDICARE

## 2025-07-11 DIAGNOSIS — Z96.652 S/P TKR (TOTAL KNEE REPLACEMENT), LEFT: Primary | ICD-10-CM

## 2025-07-11 PROCEDURE — 97110 THERAPEUTIC EXERCISES: CPT

## 2025-07-11 PROCEDURE — 97112 NEUROMUSCULAR REEDUCATION: CPT

## 2025-07-11 PROCEDURE — 97140 MANUAL THERAPY 1/> REGIONS: CPT

## 2025-07-11 NOTE — PROGRESS NOTES
"Daily Note     Today's date: 2025  Patient name: Neal Delgadillo  : 1945  MRN: [id number]  Referring provider: Miguel Angel Sheppard*  Dx:   Encounter Diagnosis     ICD-10-CM    1. S/P TKR (total knee replacement), left  Z96.652                      Subjective:  Pt reports feeling good with no new c/o's.        Objective: See treatment diary below      Assessment: Tolerated treatment well.  Redness slowly decreasing left distal LE.  Good tolerance to manual therapy and progression of exercise as noted without increase pain. Progressing slowly with left LE functional mobility, strength, gait and stability.   Pt reports feeling good post treatment. Pt would benefit from continued PT.      Plan: Continue per plan of care.        Precautions: none      Manuals 7/3 7/10 7/11 6/12 6/16 6/19 6/24 6/27 6/30    PROM knee flexion/ext  GH GH GH CB GH CB GH GH GH    STM/Patellar mobs GH GH GH CB GH CB GH GH GH                              Neuro Re-Ed             Exaggerate gait with march at railing  20 ft x4 20 ft x4 CB GH CB GH GH     Standing march at railing 5\"x10 each            Chair squats at railing  x15 x20 x20 CB nv x20  x15 GH    Side steps at railing 20 ft x4 no  UE assist 20 ft x4 no UE assist 20 ft x4 no UE assist CB GH CB GH GH GH                                           Ther Ex             Bike ROM 8' 8' 10' 10' 10'  10' 5' 6' 8'    Supine heel slide                           Sit to stand             Leg press # x30 # x20 110# x30 GH CB 95# x20 110#x  20 110#  x30    LAQ    CB GH 7.5# 2x10       Bridge    5\"x10 GH held       Seated HS stretch with strap     10\"x 10 10x10:        SLR        x15     Lunge stretch at step             Ther Activity             6 inch step up/down 3 steps x5(alternate with railing) GH GH CB nv    3 steps x5 alternating with railing)                 Gait Training             Without assistive device in clinic  GH GH GH CB GH                   "   Modalities

## 2025-07-14 ENCOUNTER — APPOINTMENT (OUTPATIENT)
Dept: PHYSICAL THERAPY | Facility: CLINIC | Age: 80
End: 2025-07-14
Payer: MEDICARE

## 2025-07-17 ENCOUNTER — OFFICE VISIT (OUTPATIENT)
Dept: PHYSICAL THERAPY | Facility: CLINIC | Age: 80
End: 2025-07-17
Payer: MEDICARE

## 2025-07-17 DIAGNOSIS — Z96.652 S/P TKR (TOTAL KNEE REPLACEMENT), LEFT: Primary | ICD-10-CM

## 2025-07-17 PROCEDURE — 97140 MANUAL THERAPY 1/> REGIONS: CPT | Performed by: PHYSICAL THERAPIST

## 2025-07-17 PROCEDURE — 97112 NEUROMUSCULAR REEDUCATION: CPT | Performed by: PHYSICAL THERAPIST

## 2025-07-17 PROCEDURE — 97110 THERAPEUTIC EXERCISES: CPT | Performed by: PHYSICAL THERAPIST

## 2025-07-17 NOTE — PROGRESS NOTES
"Daily Note     Today's date: 2025  Patient name: Neal Delgadillo  : 1945  MRN: [id number]  Referring provider: Miguel Angel Sheppard*  Dx:   Encounter Diagnosis     ICD-10-CM    1. S/P TKR (total knee replacement), left  Z96.652                      Subjective: Pt reports no new complaints in the knee recently.       Objective: See treatment diary below    .  Assessment: Tolerated treatment well. Patient would benefit from continued PT.      Plan: Continue per plan of care.      Precautions: none      Manuals 7/3 7/10 7/11 7/17 6/16 6/19 6/24 6/27 6/30    PROM knee flexion/ext  GH GH GH CB GH CB GH GH GH    STM/Patellar mobs GH GH GH CB GH CB GH GH GH                              Neuro Re-Ed             Exaggerate gait with march at railing  20 ft x4 20 ft x4 CB GH CB GH GH CB    Standing march at railing 5\"x10 each            Chair squats at railing  x15 x20 x20 CB nv x20  x15 GH    Side steps at railing 20 ft x4 no  UE assist 20 ft x4 no UE assist 20 ft x4 no UE assist CB GH CB GH GH GH                                           Ther Ex             Bike ROM 8' 8' 10' 10' 10'  10' 5' 6' 8'    Supine heel slide                           Sit to stand             Leg press # x30 # x20 130# x30 GH CB 95# x20 110#x  20 110#  x30    LAQ     GH 7.5# 2x10       Bridge     GH held       Seated HS stretch with strap     10\"x 10 10x10:        SLR        x15     Lunge stretch at step             Ther Activity             6 inch step up/down 3 steps x5(alternate with railing) GH GH CB nv    3 steps x5 alternating with railing)                 Gait Training             Without assistive device in clinic  GH GH GH CB GH                     Modalities                                                              "

## 2025-07-21 ENCOUNTER — OFFICE VISIT (OUTPATIENT)
Dept: PHYSICAL THERAPY | Facility: CLINIC | Age: 80
End: 2025-07-21
Payer: MEDICARE

## 2025-07-21 DIAGNOSIS — Z96.652 S/P TKR (TOTAL KNEE REPLACEMENT), LEFT: Primary | ICD-10-CM

## 2025-07-21 PROCEDURE — 97110 THERAPEUTIC EXERCISES: CPT

## 2025-07-21 PROCEDURE — 97112 NEUROMUSCULAR REEDUCATION: CPT

## 2025-07-21 PROCEDURE — 97140 MANUAL THERAPY 1/> REGIONS: CPT

## 2025-07-21 NOTE — PROGRESS NOTES
"Daily Note     Today's date: 2025  Patient name: Neal Delgadillo  : 1945  MRN: [id number]  Referring provider: Miguel Angel Sheppard*  Dx:   Encounter Diagnosis     ICD-10-CM    1. S/P TKR (total knee replacement), left  Z96.652                      Subjective:  Patient reports that his knee feels good but his leg is weak.        Objective: See treatment diary below      Assessment: Patient tolerated treatment well. Patient performed ex and received manual therapy as noted.  Provided cues to improve gait mechanics without an AD.  Fair carry over of cues.  Patient continues to have L distal LE redness which the patient's daughter notes that the Dr is aware and has issued a second antibiotic.  Patient is challenged with step downs on the L due to limited knee flexion and eccentric control.  Patient would benefit from continued PT intervention to address deficits and attain set goals.       Plan: Continue per plan of care.      Precautions: none      Manuals 7/3 7/10 7/11 7/17 7/21  6/24 6/27 6/30    PROM knee flexion/ext  GH GH GH CB CC  GH GH GH    STM/Patellar mobs GH GH GH CB CC  GH GH GH                              Neuro Re-Ed             Exaggerate gait with march at railing  20 ft x4 20 ft x4 CB 20 ft x4  GH GH CB    Standing march at railing 5\"x10 each            Chair squats at railing  x15 x20 x20 CB 2x10   x15 GH    Side steps at railing 20 ft x4 no  UE assist 20 ft x4 no UE assist 20 ft x4 no UE assist CB 20ft x4 no UE support  GH GH GH                                           Ther Ex             Bike ROM 8' 8' 10' 10' x10'  5' 6' 8'    Supine heel slide                           Sit to stand             Leg press # x30 # x20 130# x30 130#  x30  95# x20 110#x  20 110#  x30    LAQ             Bridge             Seated HS stretch with strap             SLR        x15     Lunge stretch at step             Ther Activity             6 inch step up/down 3 steps x5(alternate with " railing) GH GH CB CC    3 steps x5 alternating with railing)                 Gait Training             Without assistive device in clinic   GH GH CB CC with cues                     Modalities

## 2025-07-24 ENCOUNTER — OFFICE VISIT (OUTPATIENT)
Dept: PHYSICAL THERAPY | Facility: CLINIC | Age: 80
End: 2025-07-24
Payer: MEDICARE

## 2025-07-24 DIAGNOSIS — Z96.652 S/P TKR (TOTAL KNEE REPLACEMENT), LEFT: Primary | ICD-10-CM

## 2025-07-24 PROCEDURE — 97110 THERAPEUTIC EXERCISES: CPT

## 2025-07-24 PROCEDURE — 97140 MANUAL THERAPY 1/> REGIONS: CPT

## 2025-07-24 PROCEDURE — 97112 NEUROMUSCULAR REEDUCATION: CPT

## 2025-07-24 NOTE — PROGRESS NOTES
"Daily Note     Today's date: 2025  Patient name: Neal Delgadillo  : 1945  MRN: [id number]  Referring provider: Miguel Angel Sheppard*  Dx:   Encounter Diagnosis     ICD-10-CM    1. S/P TKR (total knee replacement), left  Z96.652                      Subjective: Bhargav's daughter reports the he is compliant with HEP, and that he safely ambulates outside with his AD.       Objective: See treatment diary below      Assessment: Tolerated treatment well. Worked on increased R step length to normalized gait, inconsistent execution. Reduced the weight on the leg press this session d/t fatigue. Improving knee ROM with manuals, limited into flexion>ext. Continued PT would be beneficial to improve function.           Plan: Continue per plan of care.       Precautions: none      Manuals 7/3 7/10 7/11 7/17 7/21 7/24       PROM knee flexion/ext  GH GH GH CB CC MB       STM/Patellar mobs GH GH GH CB CC MB                                 Neuro Re-Ed             Exaggerate gait with march at railing  20 ft x4 20 ft x4 CB 20 ft x4 MB       Standing march at railing 5\"x10 each            Chair squats at railing  x15 x20 x20 CB 2x10 2x10       Side steps at railing 20 ft x4 no  UE assist 20 ft x4 no UE assist 20 ft x4 no UE assist CB 20ft x4 no UE support 20ft x4 no UE support                                              Ther Ex             Bike ROM 8' 8' 10' 10' x10' 10'       Supine heel slide                           Sit to stand             Leg press # x30 # x20 130# x30 130#  x30 110x 30x       LAQ             Bridge             Seated HS stretch with strap             SLR             Lunge stretch at step             Ther Activity             6 inch step up/down 3 steps x5(alternate with railing) GH GH CB CC MB                    Gait Training             Without assistive device in clinic  GH GH GH CB CC with cues MB                    Modalities                                     "

## 2025-07-28 ENCOUNTER — OFFICE VISIT (OUTPATIENT)
Dept: PHYSICAL THERAPY | Facility: CLINIC | Age: 80
End: 2025-07-28
Payer: MEDICARE

## 2025-07-28 DIAGNOSIS — Z96.652 S/P TKR (TOTAL KNEE REPLACEMENT), LEFT: Primary | ICD-10-CM

## 2025-07-28 PROCEDURE — 97112 NEUROMUSCULAR REEDUCATION: CPT

## 2025-07-28 PROCEDURE — 97140 MANUAL THERAPY 1/> REGIONS: CPT

## 2025-07-28 PROCEDURE — 97110 THERAPEUTIC EXERCISES: CPT

## 2025-08-01 ENCOUNTER — OFFICE VISIT (OUTPATIENT)
Dept: PHYSICAL THERAPY | Facility: CLINIC | Age: 80
End: 2025-08-01
Payer: MEDICARE

## 2025-08-01 DIAGNOSIS — Z96.652 S/P TKR (TOTAL KNEE REPLACEMENT), LEFT: Primary | ICD-10-CM

## 2025-08-01 PROCEDURE — 97110 THERAPEUTIC EXERCISES: CPT

## 2025-08-01 PROCEDURE — 97140 MANUAL THERAPY 1/> REGIONS: CPT

## 2025-08-01 PROCEDURE — 97112 NEUROMUSCULAR REEDUCATION: CPT
